# Patient Record
Sex: MALE | Race: WHITE | NOT HISPANIC OR LATINO | Employment: OTHER | URBAN - METROPOLITAN AREA
[De-identification: names, ages, dates, MRNs, and addresses within clinical notes are randomized per-mention and may not be internally consistent; named-entity substitution may affect disease eponyms.]

---

## 2023-07-11 ENCOUNTER — OFFICE VISIT (OUTPATIENT)
Dept: PRIMARY CARE | Facility: CLINIC | Age: 73
End: 2023-07-11
Payer: COMMERCIAL

## 2023-07-11 VITALS
DIASTOLIC BLOOD PRESSURE: 75 MMHG | BODY MASS INDEX: 23.59 KG/M2 | WEIGHT: 164.8 LBS | OXYGEN SATURATION: 98 % | HEART RATE: 70 BPM | SYSTOLIC BLOOD PRESSURE: 135 MMHG | HEIGHT: 70 IN

## 2023-07-11 DIAGNOSIS — D72.829 LEUKOCYTOSIS, UNSPECIFIED TYPE: ICD-10-CM

## 2023-07-11 DIAGNOSIS — E03.9 HYPOTHYROIDISM, UNSPECIFIED TYPE: ICD-10-CM

## 2023-07-11 DIAGNOSIS — E53.8 VITAMIN B12 DEFICIENCY: ICD-10-CM

## 2023-07-11 DIAGNOSIS — I95.1 ORTHOSTATIC HYPOTENSION: ICD-10-CM

## 2023-07-11 DIAGNOSIS — M06.9 RHEUMATOID ARTHRITIS INVOLVING MULTIPLE SITES, UNSPECIFIED WHETHER RHEUMATOID FACTOR PRESENT (MULTI): ICD-10-CM

## 2023-07-11 DIAGNOSIS — R73.9 ELEVATED BLOOD SUGAR: ICD-10-CM

## 2023-07-11 DIAGNOSIS — K21.9 GASTROESOPHAGEAL REFLUX DISEASE WITHOUT ESOPHAGITIS: ICD-10-CM

## 2023-07-11 DIAGNOSIS — E55.9 VITAMIN D DEFICIENCY: ICD-10-CM

## 2023-07-11 DIAGNOSIS — G40.909 SEIZURE DISORDER (MULTI): ICD-10-CM

## 2023-07-11 DIAGNOSIS — M81.0 OSTEOPOROSIS, SENILE: ICD-10-CM

## 2023-07-11 DIAGNOSIS — J42 CHRONIC BRONCHITIS, UNSPECIFIED CHRONIC BRONCHITIS TYPE (MULTI): ICD-10-CM

## 2023-07-11 DIAGNOSIS — N18.30 STAGE 3 CHRONIC KIDNEY DISEASE, UNSPECIFIED WHETHER STAGE 3A OR 3B CKD (MULTI): ICD-10-CM

## 2023-07-11 DIAGNOSIS — Z79.899 MEDICATION MANAGEMENT: ICD-10-CM

## 2023-07-11 DIAGNOSIS — R45.86 MOOD SWINGS: ICD-10-CM

## 2023-07-11 DIAGNOSIS — I73.9 PERIPHERAL VASCULAR DISEASE (CMS-HCC): Primary | ICD-10-CM

## 2023-07-11 DIAGNOSIS — D61.818 PANCYTOPENIA (MULTI): ICD-10-CM

## 2023-07-11 DIAGNOSIS — E78.5 HYPERLIPIDEMIA, UNSPECIFIED HYPERLIPIDEMIA TYPE: ICD-10-CM

## 2023-07-11 DIAGNOSIS — E11.8 TYPE 2 DIABETES MELLITUS WITH UNSPECIFIED COMPLICATIONS (MULTI): ICD-10-CM

## 2023-07-11 DIAGNOSIS — C34.90 MALIGNANT NEOPLASM OF LUNG, UNSPECIFIED LATERALITY, UNSPECIFIED PART OF LUNG (MULTI): ICD-10-CM

## 2023-07-11 PROBLEM — M25.519 SHOULDER PAIN: Status: ACTIVE | Noted: 2023-07-11

## 2023-07-11 PROBLEM — G62.9 PERIPHERAL NEUROPATHY: Status: ACTIVE | Noted: 2023-07-11

## 2023-07-11 PROBLEM — R74.8 ELEVATED ALKALINE PHOSPHATASE LEVEL: Status: ACTIVE | Noted: 2023-07-11

## 2023-07-11 PROBLEM — M15.9 OSTEOARTHRITIS OF MULTIPLE JOINTS: Status: ACTIVE | Noted: 2023-07-11

## 2023-07-11 PROBLEM — H53.40 VISUAL FIELD LOSS: Status: ACTIVE | Noted: 2023-07-11

## 2023-07-11 PROBLEM — M70.20 OLECRANON BURSITIS: Status: ACTIVE | Noted: 2023-07-11

## 2023-07-11 PROBLEM — M70.21 OLECRANON BURSITIS OF RIGHT ELBOW: Status: ACTIVE | Noted: 2023-07-11

## 2023-07-11 PROBLEM — S93.401A RIGHT ANKLE SPRAIN: Status: ACTIVE | Noted: 2023-07-11

## 2023-07-11 PROBLEM — K40.20 INGUINAL HERNIA, BILATERAL: Status: ACTIVE | Noted: 2023-07-11

## 2023-07-11 PROBLEM — R53.83 FATIGUE: Status: ACTIVE | Noted: 2023-07-11

## 2023-07-11 PROBLEM — R80.9 MICROALBUMINURIA: Status: ACTIVE | Noted: 2023-07-11

## 2023-07-11 PROBLEM — M10.9 ACUTE GOUT OF LEFT KNEE: Status: ACTIVE | Noted: 2023-07-11

## 2023-07-11 PROBLEM — M25.50 ARTHRALGIA OF MULTIPLE JOINTS: Status: ACTIVE | Noted: 2023-07-11

## 2023-07-11 PROBLEM — M75.00 FROZEN SHOULDER SYNDROME: Status: ACTIVE | Noted: 2023-07-11

## 2023-07-11 PROBLEM — R93.5 ABNORMAL CT OF THE ABDOMEN: Status: ACTIVE | Noted: 2023-07-11

## 2023-07-11 PROBLEM — R51.9 HEADACHE: Status: ACTIVE | Noted: 2023-07-11

## 2023-07-11 PROBLEM — M54.2 CERVICAL PAIN (NECK): Status: ACTIVE | Noted: 2023-07-11

## 2023-07-11 PROBLEM — E87.5 HYPERKALEMIA: Status: ACTIVE | Noted: 2023-07-11

## 2023-07-11 PROBLEM — M79.676 TOE PAIN: Status: ACTIVE | Noted: 2023-07-11

## 2023-07-11 PROBLEM — M10.9 POLYARTICULAR GOUT: Status: ACTIVE | Noted: 2023-07-11

## 2023-07-11 PROBLEM — R42 DIZZINESS: Status: ACTIVE | Noted: 2023-07-11

## 2023-07-11 PROBLEM — M25.579 ANKLE JOINT PAIN: Status: ACTIVE | Noted: 2023-07-11

## 2023-07-11 PROBLEM — M79.645 PAIN OF FINGER OF LEFT HAND: Status: ACTIVE | Noted: 2023-07-11

## 2023-07-11 PROBLEM — M79.609 LIMB PAIN: Status: ACTIVE | Noted: 2023-07-11

## 2023-07-11 PROBLEM — R55 VASOVAGAL SYNCOPE: Status: ACTIVE | Noted: 2023-07-11

## 2023-07-11 PROBLEM — M54.6 LEFT-SIDED THORACIC BACK PAIN: Status: ACTIVE | Noted: 2023-07-11

## 2023-07-11 PROBLEM — S50.319A ABRASION, ELBOW W/O INFECTION: Status: ACTIVE | Noted: 2023-07-11

## 2023-07-11 PROBLEM — M62.838 CERVICAL PARASPINAL MUSCLE SPASM: Status: ACTIVE | Noted: 2023-07-11

## 2023-07-11 PROBLEM — M25.531 RIGHT WRIST PAIN: Status: ACTIVE | Noted: 2023-07-11

## 2023-07-11 PROBLEM — R20.0 NUMBNESS: Status: ACTIVE | Noted: 2023-07-11

## 2023-07-11 PROBLEM — M79.673 FOOT PAIN: Status: ACTIVE | Noted: 2023-07-11

## 2023-07-11 PROCEDURE — 3078F DIAST BP <80 MM HG: CPT | Performed by: INTERNAL MEDICINE

## 2023-07-11 PROCEDURE — 3075F SYST BP GE 130 - 139MM HG: CPT | Performed by: INTERNAL MEDICINE

## 2023-07-11 PROCEDURE — 1160F RVW MEDS BY RX/DR IN RCRD: CPT | Performed by: INTERNAL MEDICINE

## 2023-07-11 PROCEDURE — 99214 OFFICE O/P EST MOD 30 MIN: CPT | Performed by: INTERNAL MEDICINE

## 2023-07-11 PROCEDURE — 1159F MED LIST DOCD IN RCRD: CPT | Performed by: INTERNAL MEDICINE

## 2023-07-11 RX ORDER — DORZOLAMIDE HCL 20 MG/ML
1 SOLUTION/ DROPS OPHTHALMIC 2 TIMES DAILY
COMMUNITY

## 2023-07-11 RX ORDER — BRIMONIDINE TARTRATE 2 MG/ML
1 SOLUTION/ DROPS OPHTHALMIC 2 TIMES DAILY
COMMUNITY

## 2023-07-11 RX ORDER — LATANOPROST 50 UG/ML
1 SOLUTION/ DROPS OPHTHALMIC NIGHTLY
COMMUNITY

## 2023-07-11 RX ORDER — AMLODIPINE BESYLATE 5 MG/1
TABLET ORAL DAILY
COMMUNITY
End: 2023-09-28 | Stop reason: SDUPTHER

## 2023-07-11 RX ORDER — ASPIRIN 81 MG/1
81 TABLET ORAL DAILY
COMMUNITY

## 2023-07-11 RX ORDER — CLOPIDOGREL BISULFATE 75 MG/1
TABLET ORAL DAILY
COMMUNITY
End: 2023-09-27 | Stop reason: SDUPTHER

## 2023-07-11 RX ORDER — GABAPENTIN 600 MG/1
TABLET ORAL
COMMUNITY

## 2023-07-11 RX ORDER — ERGOCALCIFEROL 1.25 MG/1
1.25 CAPSULE ORAL
COMMUNITY

## 2023-07-11 RX ORDER — LEVETIRACETAM 500 MG/1
TABLET ORAL 2 TIMES DAILY
COMMUNITY
End: 2024-03-08 | Stop reason: WASHOUT

## 2023-07-11 RX ORDER — PREDNISONE 5 MG/1
5 TABLET ORAL DAILY
COMMUNITY
End: 2024-01-09

## 2023-07-11 RX ORDER — FAMOTIDINE 40 MG/1
TABLET, FILM COATED ORAL
COMMUNITY

## 2023-07-11 RX ORDER — FERROUS SULFATE 325(65) MG
65 TABLET ORAL
COMMUNITY
End: 2024-04-11 | Stop reason: WASHOUT

## 2023-07-11 RX ORDER — SODIUM BICARBONATE 650 MG/1
650 TABLET ORAL 2 TIMES DAILY
COMMUNITY

## 2023-07-11 RX ORDER — LOVASTATIN 40 MG/1
40 TABLET ORAL DAILY
COMMUNITY

## 2023-07-11 RX ORDER — HYDROXYCHLOROQUINE SULFATE 200 MG/1
TABLET, FILM COATED ORAL 2 TIMES DAILY
COMMUNITY
End: 2023-07-11 | Stop reason: ALTCHOICE

## 2023-07-11 RX ORDER — LEVOTHYROXINE SODIUM 125 UG/1
125 TABLET ORAL
COMMUNITY
End: 2024-01-08 | Stop reason: SDUPTHER

## 2023-07-11 ASSESSMENT — LIFESTYLE VARIABLES
HOW MANY STANDARD DRINKS CONTAINING ALCOHOL DO YOU HAVE ON A TYPICAL DAY: PATIENT DOES NOT DRINK
HOW OFTEN DO YOU HAVE SIX OR MORE DRINKS ON ONE OCCASION: NEVER
HOW OFTEN DO YOU HAVE A DRINK CONTAINING ALCOHOL: NEVER
SKIP TO QUESTIONS 9-10: 1
AUDIT-C TOTAL SCORE: 0

## 2023-07-11 ASSESSMENT — ENCOUNTER SYMPTOMS
RESPIRATORY NEGATIVE: 1
ALLERGIC/IMMUNOLOGIC NEGATIVE: 1
EYES NEGATIVE: 1
CARDIOVASCULAR NEGATIVE: 1

## 2023-07-11 ASSESSMENT — PATIENT HEALTH QUESTIONNAIRE - PHQ9
1. LITTLE INTEREST OR PLEASURE IN DOING THINGS: NOT AT ALL
SUM OF ALL RESPONSES TO PHQ9 QUESTIONS 1 AND 2: 0
2. FEELING DOWN, DEPRESSED OR HOPELESS: NOT AT ALL

## 2023-07-11 ASSESSMENT — COLUMBIA-SUICIDE SEVERITY RATING SCALE - C-SSRS: 1. IN THE PAST MONTH, HAVE YOU WISHED YOU WERE DEAD OR WISHED YOU COULD GO TO SLEEP AND NOT WAKE UP?: NO

## 2023-07-11 NOTE — PROGRESS NOTES
Subjective   Patient ID: Jose Manuel Andujar is a 73 y.o. male who presents for New Patient Visit.  HPI      Npv  mutiple prrblems.   Copd  sp lung  cancer.  Xrt and lobectomy.   Last  A1c was good  April. .  He is watching his diet now.     Seizures controlled   Gabapentin for  neuropathy.      Basically feels ok.   No new complaints.     Review of Systems   HENT: Negative.     Eyes: Negative.    Respiratory: Negative.     Cardiovascular: Negative.    Genitourinary: Negative.    Skin: Negative.    Allergic/Immunologic: Negative.    All other systems reviewed and are negative.      Objective   Physical Exam  Vitals and nursing note reviewed.   Constitutional:       Appearance: Normal appearance.   HENT:      Head: Normocephalic and atraumatic.      Left Ear: External ear normal.      Nose: Nose normal.      Mouth/Throat:      Pharynx: Oropharynx is clear.   Eyes:      Conjunctiva/sclera: Conjunctivae normal.   Cardiovascular:      Rate and Rhythm: Normal rate and regular rhythm.      Pulses: Normal pulses.      Heart sounds: Normal heart sounds.   Pulmonary:      Effort: Pulmonary effort is normal.   Skin:     General: Skin is dry.   Neurological:      General: No focal deficit present.      Mental Status: He is alert and oriented to person, place, and time. Mental status is at baseline.   Psychiatric:         Mood and Affect: Mood normal.         Behavior: Behavior normal.         Assessment/Plan   Problem List Items Addressed This Visit          Medium    Pancytopenia (CMS/HCC)    Relevant Medications    clopidogrel (Plavix) 75 mg tablet    aspirin 81 mg EC tablet    Vitamin D deficiency    Vitamin B12 deficiency    Seizure disorder (CMS/HCC)    Peripheral vascular disease (CMS/HCC) - Primary    Osteoporosis, senile    Orthostatic hypotension    Mood swings    Lung cancer (CMS/HCC)    Leukocytosis    Hypothyroidism    Hyperlipidemia    Gastroesophageal reflux disease    Elevated blood sugar    Rheumatoid arthritis  involving multiple sites, unspecified whether rheumatoid factor present (CMS/HCC)    Chronic bronchitis, unspecified chronic bronchitis type (CMS/HCC)    Type 2 diabetes mellitus with unspecified complications (CMS/[facility])    Stage 3 chronic kidney disease, unspecified whether stage 3a or 3b CKD (CMS/[facility])     Other Visit Diagnoses       Medication management              Chronic problems controlled  on current treatment  unless otherwise noted.     CHART  REVIEWED AND  RECONCILED WITH OLD DATA / UPDATED IN NEW SYSTEM:  MEDS,  PROBLEMS,  ALLERGIES, SOC HISTORY.     Medication management .

## 2023-09-11 DIAGNOSIS — I73.9 PERIPHERAL VASCULAR DISEASE, UNSPECIFIED (CMS-HCC): ICD-10-CM

## 2023-09-11 RX ORDER — ATORVASTATIN CALCIUM 40 MG/1
40 TABLET, FILM COATED ORAL DAILY
Qty: 90 TABLET | Refills: 1 | Status: SHIPPED | OUTPATIENT
Start: 2023-09-11 | End: 2024-02-26

## 2023-09-27 DIAGNOSIS — I73.9 PERIPHERAL VASCULAR DISEASE (CMS-HCC): Primary | ICD-10-CM

## 2023-09-27 DIAGNOSIS — I65.29 CAROTID ARTERY CALCIFICATION, UNSPECIFIED LATERALITY: ICD-10-CM

## 2023-09-27 RX ORDER — CLOPIDOGREL BISULFATE 75 MG/1
75 TABLET ORAL DAILY
Qty: 90 TABLET | Refills: 1 | Status: SHIPPED | OUTPATIENT
Start: 2023-09-27 | End: 2024-03-25

## 2023-09-28 DIAGNOSIS — I73.9 PERIPHERAL VASCULAR DISEASE (CMS-HCC): Primary | ICD-10-CM

## 2023-09-28 DIAGNOSIS — I10 HYPERTENSION, UNSPECIFIED TYPE: ICD-10-CM

## 2023-09-28 RX ORDER — AMLODIPINE BESYLATE 5 MG/1
5 TABLET ORAL DAILY
Qty: 90 TABLET | Refills: 1 | Status: SHIPPED | OUTPATIENT
Start: 2023-09-28 | End: 2024-03-25

## 2023-12-29 ENCOUNTER — APPOINTMENT (OUTPATIENT)
Dept: RHEUMATOLOGY | Facility: CLINIC | Age: 73
End: 2023-12-29
Payer: COMMERCIAL

## 2024-01-03 ENCOUNTER — OFFICE VISIT (OUTPATIENT)
Dept: RHEUMATOLOGY | Facility: CLINIC | Age: 74
End: 2024-01-03
Payer: COMMERCIAL

## 2024-01-03 VITALS — WEIGHT: 163 LBS | DIASTOLIC BLOOD PRESSURE: 66 MMHG | BODY MASS INDEX: 23.39 KG/M2 | SYSTOLIC BLOOD PRESSURE: 138 MMHG

## 2024-01-03 DIAGNOSIS — M06.9 RHEUMATOID ARTHRITIS INVOLVING MULTIPLE SITES, UNSPECIFIED WHETHER RHEUMATOID FACTOR PRESENT (MULTI): Primary | ICD-10-CM

## 2024-01-03 DIAGNOSIS — M81.0 OSTEOPOROSIS, SENILE: ICD-10-CM

## 2024-01-03 DIAGNOSIS — M15.9 OSTEOARTHRITIS OF MULTIPLE JOINTS, UNSPECIFIED OSTEOARTHRITIS TYPE: ICD-10-CM

## 2024-01-03 PROCEDURE — 99214 OFFICE O/P EST MOD 30 MIN: CPT | Performed by: INTERNAL MEDICINE

## 2024-01-03 PROCEDURE — 1159F MED LIST DOCD IN RCRD: CPT | Performed by: INTERNAL MEDICINE

## 2024-01-03 PROCEDURE — 3075F SYST BP GE 130 - 139MM HG: CPT | Performed by: INTERNAL MEDICINE

## 2024-01-03 PROCEDURE — 3078F DIAST BP <80 MM HG: CPT | Performed by: INTERNAL MEDICINE

## 2024-01-03 NOTE — PROGRESS NOTES
"Recheck  OA  /  RA  /  OP      Doing well.     Bc    HPI - he is doing well.  \"I feel great!\"  No pain/swelling.  No AM stiffness.  Usual L pinky contracture.  No CP, SOB, or GI.      PE  NAD  RRR no r/m/g  CTA  No edema  Min NT synovial thickening R 3rd MCP    A/P - OA and RA - on low-dose pred but no DMARDs per pt preference - stable.  Pt to call if sx incr  OP - had reclast after prolia, on drug holiday - due for DEXA 7/24  Reeval 6 mo or sooner PRN    "

## 2024-01-06 DIAGNOSIS — E03.9 HYPOTHYROIDISM, UNSPECIFIED TYPE: Primary | ICD-10-CM

## 2024-01-08 DIAGNOSIS — M06.9 RHEUMATOID ARTHRITIS INVOLVING MULTIPLE SITES, UNSPECIFIED WHETHER RHEUMATOID FACTOR PRESENT (MULTI): Primary | ICD-10-CM

## 2024-01-08 RX ORDER — LEVOTHYROXINE SODIUM 125 UG/1
125 TABLET ORAL
Qty: 90 TABLET | Refills: 1 | Status: SHIPPED | OUTPATIENT
Start: 2024-01-08 | End: 2025-01-07

## 2024-01-09 RX ORDER — PREDNISONE 5 MG/1
5 TABLET ORAL DAILY
Qty: 90 TABLET | Refills: 1 | Status: SHIPPED | OUTPATIENT
Start: 2024-01-09

## 2024-01-11 ENCOUNTER — APPOINTMENT (OUTPATIENT)
Dept: PRIMARY CARE | Facility: CLINIC | Age: 74
End: 2024-01-11
Payer: COMMERCIAL

## 2024-01-15 ENCOUNTER — APPOINTMENT (OUTPATIENT)
Dept: PRIMARY CARE | Facility: CLINIC | Age: 74
End: 2024-01-15
Payer: COMMERCIAL

## 2024-02-12 ENCOUNTER — APPOINTMENT (OUTPATIENT)
Dept: PRIMARY CARE | Facility: CLINIC | Age: 74
End: 2024-02-12
Payer: COMMERCIAL

## 2024-02-24 DIAGNOSIS — I73.9 PERIPHERAL VASCULAR DISEASE, UNSPECIFIED (CMS-HCC): ICD-10-CM

## 2024-02-26 RX ORDER — ATORVASTATIN CALCIUM 40 MG/1
40 TABLET, FILM COATED ORAL DAILY
Qty: 90 TABLET | Refills: 1 | Status: SHIPPED | OUTPATIENT
Start: 2024-02-26

## 2024-03-05 DIAGNOSIS — G40.909 EPILEPSY, UNSPECIFIED, NOT INTRACTABLE, WITHOUT STATUS EPILEPTICUS (MULTI): ICD-10-CM

## 2024-03-05 RX ORDER — LEVETIRACETAM 250 MG/1
250 TABLET ORAL 2 TIMES DAILY
Qty: 180 TABLET | Refills: 3 | Status: SHIPPED | OUTPATIENT
Start: 2024-03-05

## 2024-03-08 ENCOUNTER — OFFICE VISIT (OUTPATIENT)
Dept: NEUROLOGY | Facility: CLINIC | Age: 74
End: 2024-03-08
Payer: COMMERCIAL

## 2024-03-08 DIAGNOSIS — G62.89 OTHER POLYNEUROPATHY: ICD-10-CM

## 2024-03-08 DIAGNOSIS — G40.909 SEIZURE DISORDER (MULTI): Primary | ICD-10-CM

## 2024-03-08 PROCEDURE — 99214 OFFICE O/P EST MOD 30 MIN: CPT | Performed by: PSYCHIATRY & NEUROLOGY

## 2024-03-08 PROCEDURE — 1160F RVW MEDS BY RX/DR IN RCRD: CPT | Performed by: PSYCHIATRY & NEUROLOGY

## 2024-03-08 PROCEDURE — 1159F MED LIST DOCD IN RCRD: CPT | Performed by: PSYCHIATRY & NEUROLOGY

## 2024-03-08 NOTE — PATIENT INSTRUCTIONS
Jose Manuel I am retiring in September.  Please establish care with another  neurologist (call [phone] to schedule an appointment).  Local neurologists I recommend are Dr. Kyle Elmore, Dr. Sheng Schuster, and Dr. Castro Clement.  Their offices are in [place] or [place] and you can schedule an appointment with them right now at your convenience.  Also, I can recommend Dr. Rudolph Coyle and Dr. Codey Guerra, who will be opening an office in [place] in the coming months - scheduling for them will be available soon by calling the same  scheduling number [phone].   And there are  neurologists in other locations too.  All of your medical records will be in the computer and available to any  physician you choose.  Thanks and best wishes to you  --Dr. Bravo

## 2024-03-08 NOTE — PROGRESS NOTES
Chief complaint:    74 year old man with seizure disorder, PN (DM, RA, CKD).  PCP Dr. Schultz.    HPI:    No seizures.  No side effects.  Takes meds regularly.  No new neurological symptoms.   PN doing about the same.  Uncomfortable sensation like he is walking on gravel from time to time, can live with it.  Been more than five years since lung cancer diagnosis, no signs of trouble there.  Feels good.    Current medications include:  Levetiracetam 250 mg takes 1-1  Gabapentin 600 mg takes 1 at HS    I reviewed the relevant portions of the patient's chart since the last visit with me on  3/10/23.    Neurologic Exam     Mental Status   Oriented to person, place, and time.   Level of consciousness: alert    Cranial Nerves   Cranial nerves II through XII intact.     Motor Exam   Muscle bulk: normal  Overall muscle tone: normal    Strength   Strength 5/5 except as noted.     Sensory Exam   Right leg vibration: decreased from toes  Left leg vibration: decreased from toes  Right leg proprioception: normal  Left leg proprioception: normal  Right leg pinprick: decreased from toes  Left leg pinprick: decreased from toes    Gait, Coordination, and Reflexes     Gait  Gait: normal    Coordination   Romberg: negative    Reflexes   Reflexes 2+ except as noted.   Right plantar: normal  Left plantar: normal     I reviewed the following data on the patient:  Lab work reviewed.    Assessment and Plan:  Seizure-free.  Doing well.  PN also stable.  Discussed.  Continue current treatment.   The patient was advised I will be retiring in September of 2024.   Suggested patient establish care with another  neurologist.  There are providers that see patients in [place], [place], and other area locations.  Contact information to schedule was provided to the patient.   Call me if needed.   [id number]>50%    Sarwat Bravo MD

## 2024-03-20 ENCOUNTER — APPOINTMENT (OUTPATIENT)
Dept: PRIMARY CARE | Facility: CLINIC | Age: 74
End: 2024-03-20
Payer: COMMERCIAL

## 2024-03-23 DIAGNOSIS — I65.29 CAROTID ARTERY CALCIFICATION, UNSPECIFIED LATERALITY: ICD-10-CM

## 2024-03-23 DIAGNOSIS — I73.9 PERIPHERAL VASCULAR DISEASE (CMS-HCC): ICD-10-CM

## 2024-03-23 DIAGNOSIS — I10 HYPERTENSION, UNSPECIFIED TYPE: ICD-10-CM

## 2024-03-25 RX ORDER — CLOPIDOGREL BISULFATE 75 MG/1
75 TABLET ORAL DAILY
Qty: 90 TABLET | Refills: 1 | Status: SHIPPED | OUTPATIENT
Start: 2024-03-25

## 2024-03-25 RX ORDER — AMLODIPINE BESYLATE 5 MG/1
5 TABLET ORAL DAILY
Qty: 90 TABLET | Refills: 1 | Status: SHIPPED | OUTPATIENT
Start: 2024-03-25

## 2024-04-04 ENCOUNTER — APPOINTMENT (OUTPATIENT)
Dept: LAB | Facility: CLINIC | Age: 74
End: 2024-04-04
Payer: COMMERCIAL

## 2024-04-04 DIAGNOSIS — C34.90 MALIGNANT NEOPLASM OF LUNG, UNSPECIFIED LATERALITY, UNSPECIFIED PART OF LUNG (MULTI): ICD-10-CM

## 2024-04-04 LAB
ALBUMIN SERPL BCP-MCNC: 3.9 G/DL (ref 3.4–5)
ALP SERPL-CCNC: 103 U/L (ref 33–136)
ALT SERPL W P-5'-P-CCNC: 20 U/L (ref 10–52)
ANION GAP SERPL CALC-SCNC: 12 MMOL/L (ref 10–20)
AST SERPL W P-5'-P-CCNC: 14 U/L (ref 9–39)
BASOPHILS # BLD AUTO: 0.04 X10*3/UL (ref 0–0.1)
BASOPHILS NFR BLD AUTO: 0.4 %
BILIRUB SERPL-MCNC: 0.4 MG/DL (ref 0–1.2)
BUN SERPL-MCNC: 25 MG/DL (ref 6–23)
CALCIUM SERPL-MCNC: 8.9 MG/DL (ref 8.6–10.3)
CHLORIDE SERPL-SCNC: 106 MMOL/L (ref 98–107)
CO2 SERPL-SCNC: 23 MMOL/L (ref 21–32)
CREAT SERPL-MCNC: 1.8 MG/DL (ref 0.5–1.3)
EGFRCR SERPLBLD CKD-EPI 2021: 39 ML/MIN/1.73M*2
EOSINOPHIL # BLD AUTO: 0.05 X10*3/UL (ref 0–0.4)
EOSINOPHIL NFR BLD AUTO: 0.6 %
ERYTHROCYTE [DISTWIDTH] IN BLOOD BY AUTOMATED COUNT: 12.6 % (ref 11.5–14.5)
GLUCOSE SERPL-MCNC: 290 MG/DL (ref 74–99)
HCT VFR BLD AUTO: 38.9 % (ref 41–52)
HGB BLD-MCNC: 13.3 G/DL (ref 13.5–17.5)
IMM GRANULOCYTES # BLD AUTO: 0.04 X10*3/UL (ref 0–0.5)
IMM GRANULOCYTES NFR BLD AUTO: 0.4 % (ref 0–0.9)
LYMPHOCYTES # BLD AUTO: 0.66 X10*3/UL (ref 0.8–3)
LYMPHOCYTES NFR BLD AUTO: 7.3 %
MCH RBC QN AUTO: 32 PG (ref 26–34)
MCHC RBC AUTO-ENTMCNC: 34.2 G/DL (ref 32–36)
MCV RBC AUTO: 94 FL (ref 80–100)
MONOCYTES # BLD AUTO: 0.49 X10*3/UL (ref 0.05–0.8)
MONOCYTES NFR BLD AUTO: 5.4 %
NEUTROPHILS # BLD AUTO: 7.78 X10*3/UL (ref 1.6–5.5)
NEUTROPHILS NFR BLD AUTO: 85.9 %
NRBC BLD-RTO: 0 /100 WBCS (ref 0–0)
PLATELET # BLD AUTO: 247 X10*3/UL (ref 150–450)
POTASSIUM SERPL-SCNC: 4.4 MMOL/L (ref 3.5–5.3)
PROT SERPL-MCNC: 6.2 G/DL (ref 6.4–8.2)
RBC # BLD AUTO: 4.15 X10*6/UL (ref 4.5–5.9)
SODIUM SERPL-SCNC: 137 MMOL/L (ref 136–145)
WBC # BLD AUTO: 9.1 X10*3/UL (ref 4.4–11.3)

## 2024-04-04 PROCEDURE — 36415 COLL VENOUS BLD VENIPUNCTURE: CPT | Performed by: STUDENT IN AN ORGANIZED HEALTH CARE EDUCATION/TRAINING PROGRAM

## 2024-04-04 PROCEDURE — 85025 COMPLETE CBC W/AUTO DIFF WBC: CPT | Performed by: STUDENT IN AN ORGANIZED HEALTH CARE EDUCATION/TRAINING PROGRAM

## 2024-04-04 PROCEDURE — 80053 COMPREHEN METABOLIC PANEL: CPT | Performed by: STUDENT IN AN ORGANIZED HEALTH CARE EDUCATION/TRAINING PROGRAM

## 2024-04-05 ENCOUNTER — HOSPITAL ENCOUNTER (OUTPATIENT)
Dept: RADIOLOGY | Facility: CLINIC | Age: 74
Discharge: HOME | End: 2024-04-05
Payer: COMMERCIAL

## 2024-04-05 DIAGNOSIS — C34.11 MALIGNANT NEOPLASM OF UPPER LOBE, RIGHT BRONCHUS OR LUNG (MULTI): ICD-10-CM

## 2024-04-05 DIAGNOSIS — C34.90 MALIGNANT NEOPLASM OF LUNG, UNSPECIFIED LATERALITY, UNSPECIFIED PART OF LUNG (MULTI): ICD-10-CM

## 2024-04-05 PROCEDURE — 71250 CT THORAX DX C-: CPT

## 2024-04-05 PROCEDURE — 71250 CT THORAX DX C-: CPT | Performed by: RADIOLOGY

## 2024-04-09 ENCOUNTER — APPOINTMENT (OUTPATIENT)
Dept: HEMATOLOGY/ONCOLOGY | Facility: CLINIC | Age: 74
End: 2024-04-09
Payer: COMMERCIAL

## 2024-04-11 ENCOUNTER — APPOINTMENT (OUTPATIENT)
Dept: PRIMARY CARE | Facility: CLINIC | Age: 74
End: 2024-04-11
Payer: COMMERCIAL

## 2024-04-11 ENCOUNTER — OFFICE VISIT (OUTPATIENT)
Dept: PRIMARY CARE | Facility: CLINIC | Age: 74
End: 2024-04-11
Payer: COMMERCIAL

## 2024-04-11 ENCOUNTER — HOSPITAL ENCOUNTER (OUTPATIENT)
Dept: RADIOLOGY | Facility: HOSPITAL | Age: 74
Discharge: HOME | End: 2024-04-11
Payer: COMMERCIAL

## 2024-04-11 VITALS
WEIGHT: 160 LBS | HEART RATE: 66 BPM | OXYGEN SATURATION: 95 % | HEIGHT: 70 IN | DIASTOLIC BLOOD PRESSURE: 62 MMHG | BODY MASS INDEX: 22.9 KG/M2 | SYSTOLIC BLOOD PRESSURE: 136 MMHG

## 2024-04-11 DIAGNOSIS — E11.22 TYPE 2 DM WITH CKD STAGE 3 AND HYPERTENSION (MULTI): ICD-10-CM

## 2024-04-11 DIAGNOSIS — M06.9 RHEUMATOID ARTHRITIS INVOLVING MULTIPLE SITES, UNSPECIFIED WHETHER RHEUMATOID FACTOR PRESENT (MULTI): ICD-10-CM

## 2024-04-11 DIAGNOSIS — Z00.00 ROUTINE GENERAL MEDICAL EXAMINATION AT HEALTH CARE FACILITY: Primary | ICD-10-CM

## 2024-04-11 DIAGNOSIS — Z13.1 DIABETES MELLITUS SCREENING: ICD-10-CM

## 2024-04-11 DIAGNOSIS — N28.1 CYST OF KIDNEY, ACQUIRED: ICD-10-CM

## 2024-04-11 DIAGNOSIS — Z12.5 SCREENING FOR PROSTATE CANCER: ICD-10-CM

## 2024-04-11 DIAGNOSIS — E03.9 HYPOTHYROIDISM, UNSPECIFIED TYPE: ICD-10-CM

## 2024-04-11 DIAGNOSIS — K21.9 GASTROESOPHAGEAL REFLUX DISEASE WITHOUT ESOPHAGITIS: ICD-10-CM

## 2024-04-11 DIAGNOSIS — Z13.6 SCREENING FOR AAA (ABDOMINAL AORTIC ANEURYSM): ICD-10-CM

## 2024-04-11 DIAGNOSIS — E11.8 TYPE 2 DIABETES MELLITUS WITH UNSPECIFIED COMPLICATIONS (MULTI): ICD-10-CM

## 2024-04-11 DIAGNOSIS — E78.5 DYSLIPIDEMIA: ICD-10-CM

## 2024-04-11 DIAGNOSIS — I73.9 PERIPHERAL VASCULAR DISEASE (CMS-HCC): ICD-10-CM

## 2024-04-11 DIAGNOSIS — Z13.6 SCREENING FOR CARDIOVASCULAR CONDITION: ICD-10-CM

## 2024-04-11 DIAGNOSIS — N18.30 TYPE 2 DM WITH CKD STAGE 3 AND HYPERTENSION (MULTI): ICD-10-CM

## 2024-04-11 DIAGNOSIS — I12.9 TYPE 2 DM WITH CKD STAGE 3 AND HYPERTENSION (MULTI): ICD-10-CM

## 2024-04-11 DIAGNOSIS — G40.909 SEIZURE DISORDER (MULTI): ICD-10-CM

## 2024-04-11 PROCEDURE — 76770 US EXAM ABDO BACK WALL COMP: CPT

## 2024-04-11 PROCEDURE — 3075F SYST BP GE 130 - 139MM HG: CPT | Performed by: INTERNAL MEDICINE

## 2024-04-11 PROCEDURE — 1160F RVW MEDS BY RX/DR IN RCRD: CPT | Performed by: INTERNAL MEDICINE

## 2024-04-11 PROCEDURE — 76770 US EXAM ABDO BACK WALL COMP: CPT | Performed by: RADIOLOGY

## 2024-04-11 PROCEDURE — 1170F FXNL STATUS ASSESSED: CPT | Performed by: INTERNAL MEDICINE

## 2024-04-11 PROCEDURE — 93000 ELECTROCARDIOGRAM COMPLETE: CPT | Performed by: INTERNAL MEDICINE

## 2024-04-11 PROCEDURE — 3078F DIAST BP <80 MM HG: CPT | Performed by: INTERNAL MEDICINE

## 2024-04-11 PROCEDURE — 99214 OFFICE O/P EST MOD 30 MIN: CPT | Performed by: INTERNAL MEDICINE

## 2024-04-11 PROCEDURE — 1126F AMNT PAIN NOTED NONE PRSNT: CPT | Performed by: INTERNAL MEDICINE

## 2024-04-11 PROCEDURE — 1159F MED LIST DOCD IN RCRD: CPT | Performed by: INTERNAL MEDICINE

## 2024-04-11 RX ORDER — GLIMEPIRIDE 4 MG/1
4 TABLET ORAL
Qty: 30 TABLET | Refills: 3 | Status: SHIPPED | OUTPATIENT
Start: 2024-04-11 | End: 2025-04-11

## 2024-04-11 RX ORDER — INSULIN PUMP SYRINGE, 3 ML
EACH MISCELLANEOUS
Qty: 1 EACH | Refills: 0 | Status: SHIPPED | OUTPATIENT
Start: 2024-04-11 | End: 2025-04-11

## 2024-04-11 RX ORDER — BLOOD SUGAR DIAGNOSTIC
1 STRIP MISCELLANEOUS DAILY
Qty: 30 STRIP | Refills: 2 | Status: SHIPPED | OUTPATIENT
Start: 2024-04-11

## 2024-04-11 RX ORDER — LANCETS 33 GAUGE
EACH MISCELLANEOUS
Qty: 30 EACH | Refills: 0 | Status: SHIPPED | OUTPATIENT
Start: 2024-04-11 | End: 2024-05-13

## 2024-04-11 ASSESSMENT — ACTIVITIES OF DAILY LIVING (ADL)
DRESSING: INDEPENDENT
MANAGING_FINANCES: INDEPENDENT
BATHING: INDEPENDENT
DOING_HOUSEWORK: INDEPENDENT
GROCERY_SHOPPING: INDEPENDENT
TAKING_MEDICATION: INDEPENDENT

## 2024-04-11 ASSESSMENT — COLUMBIA-SUICIDE SEVERITY RATING SCALE - C-SSRS: 1. IN THE PAST MONTH, HAVE YOU WISHED YOU WERE DEAD OR WISHED YOU COULD GO TO SLEEP AND NOT WAKE UP?: NO

## 2024-04-11 ASSESSMENT — PATIENT HEALTH QUESTIONNAIRE - PHQ9
SUM OF ALL RESPONSES TO PHQ9 QUESTIONS 1 AND 2: 0
1. LITTLE INTEREST OR PLEASURE IN DOING THINGS: NOT AT ALL
2. FEELING DOWN, DEPRESSED OR HOPELESS: NOT AT ALL

## 2024-04-11 ASSESSMENT — PAIN SCALES - GENERAL: PAINLEVEL: 0-NO PAIN

## 2024-04-11 NOTE — PROGRESS NOTES
"Subjective   Patient ID: Jose Manuel Andujar is a 74 y.o. male who presents for Medicare Annual Wellness Visit Initial.    HPI     Review of Systems    Objective   /62   Pulse 66   Ht 1.778 m (5' 10\")   Wt 72.6 kg (160 lb)   SpO2 95%   BMI 22.96 kg/m²     Physical Exam    Assessment/Plan          "

## 2024-04-11 NOTE — PROGRESS NOTES
Subjective   Reason for Visit: Jose Manuel Andujar is an 74 y.o. male here for a Medicare Wellness visit.     Past Medical, Surgical, and Family History reviewed and updated in chart.    Reviewed all medications by prescribing practitioner or clinical pharmacist (such as prescriptions, OTCs, herbal therapies and supplements) and documented in the medical record.    HPI patient presents to clinic for follow-up visit on history of seizure disorder, hypothyroidism, dyslipidemia, rheumatoid arthritis, nicotine dependence, lung cancer requiring right partial lobectomy, osteoporosis, hypothyroidism, gastroesophageal reflux disease, peripheral vascular disease,s/p stenting of right leg, impaired fasting glucose, stage III chronic kidney disease, peripheral neuropathy, left carotid endarterectomy in 2018, carotid artery disease, and nicotine dependence.  He is doing fairly well and denies any cough, congestion, hemoptysis weight loss and palpitation.  He is also here for Medicare annual wellness exam.  Laboratory studies done last week with oncology clinic revealed serum glucose of 290, creatinine of 1.80 BUN of 25 GFR of 39 mL/min and other studies have been reviewed and discussed with him.  EKG done shows sinus rhythm at 66 bpm with normal axis normal intervals nonspecific T wave changes without any ischemia.    Patient Care Team:  Paulie Benites MD as PCP - General (Internal Medicine)  Rebecca Schultz MD as PCP - [facility] [employer] PCP  Bubba Parsons DO     Review of Systems   Constitutional: Negative.    HENT: Negative.     Eyes: Negative.    Respiratory: Negative.     Cardiovascular: Negative.    Gastrointestinal: Negative.    Endocrine: Negative.    Genitourinary: Negative.    Musculoskeletal: Negative.    Skin: Negative.    Allergic/Immunologic: Negative.    Neurological: Negative.    Hematological: Negative.    Psychiatric/Behavioral: Negative.         Objective   Vitals:  /62   Pulse 66   Ht 1.778  "m (5' 10\")   Wt 72.6 kg (160 lb)   SpO2 95%   BMI 22.96 kg/m²       Physical Exam  Constitutional:       Appearance: Normal appearance. He is normal weight.   HENT:      Right Ear: Tympanic membrane normal.      Left Ear: Tympanic membrane and ear canal normal.      Nose: Nose normal.   Neck:      Vascular: No carotid bruit.   Cardiovascular:      Rate and Rhythm: Normal rate.   Pulmonary:      Effort: No respiratory distress.      Breath sounds: No stridor. No wheezing.   Abdominal:      Palpations: Abdomen is soft.      Tenderness: There is no abdominal tenderness. There is no guarding or rebound.   Skin:     Coloration: Skin is not jaundiced.      Findings: No bruising.   Neurological:      General: No focal deficit present.      Mental Status: He is alert and oriented to person, place, and time.   Psychiatric:         Mood and Affect: Mood normal.         Assessment/Plan   Problem List Items Addressed This Visit    None  Patient will be scheduled for CT cardiac scoring regarding screening for heart disease and abdominal aortic ultrasound regarding screening for abdominal aortic aneurysm.  He is encouraged to cut down on tobacco use.  He will be scheduled for routine blood work.  He is started on glimepiride 4 mg daily regarding new onset of diabetes.  He will continue other medications and will return to clinic in 1 to 2 months for follow-up visit.  He will continue to follow-up with oncology clinic regarding history of lung cancer.       "

## 2024-04-14 ASSESSMENT — ENCOUNTER SYMPTOMS
RESPIRATORY NEGATIVE: 1
CARDIOVASCULAR NEGATIVE: 1
MUSCULOSKELETAL NEGATIVE: 1
GASTROINTESTINAL NEGATIVE: 1
NEUROLOGICAL NEGATIVE: 1
ALLERGIC/IMMUNOLOGIC NEGATIVE: 1
CONSTITUTIONAL NEGATIVE: 1
EYES NEGATIVE: 1
HEMATOLOGIC/LYMPHATIC NEGATIVE: 1
ENDOCRINE NEGATIVE: 1
PSYCHIATRIC NEGATIVE: 1

## 2024-04-16 ENCOUNTER — TELEPHONE (OUTPATIENT)
Dept: PRIMARY CARE | Facility: CLINIC | Age: 74
End: 2024-04-16
Payer: COMMERCIAL

## 2024-04-16 NOTE — TELEPHONE ENCOUNTER
Patients blood glucose monitoring meter kit is out of stock everywhere he is wondering if there is another one that works with his test strips and lancets

## 2024-04-17 ENCOUNTER — TELEPHONE (OUTPATIENT)
Dept: PRIMARY CARE | Facility: CLINIC | Age: 74
End: 2024-04-17
Payer: COMMERCIAL

## 2024-04-17 DIAGNOSIS — E11.8 TYPE 2 DIABETES MELLITUS WITH UNSPECIFIED COMPLICATIONS (MULTI): Primary | ICD-10-CM

## 2024-04-17 RX ORDER — BLOOD-GLUCOSE METER
1 EACH MISCELLANEOUS DAILY
Qty: 50 STRIP | Refills: 2 | Status: SHIPPED | OUTPATIENT
Start: 2024-04-17

## 2024-04-29 ENCOUNTER — TELEPHONE (OUTPATIENT)
Dept: PRIMARY CARE | Facility: CLINIC | Age: 74
End: 2024-04-29
Payer: COMMERCIAL

## 2024-04-29 NOTE — TELEPHONE ENCOUNTER
Pt stopped taking glimepiride due to sugar being between 45-50 and after he stopped taking it would be 90 in the morning and 120 evenings. He said as long as he doesn't eat sweets his levels are good.

## 2024-05-09 ENCOUNTER — OFFICE VISIT (OUTPATIENT)
Dept: HEMATOLOGY/ONCOLOGY | Facility: CLINIC | Age: 74
End: 2024-05-09
Payer: COMMERCIAL

## 2024-05-09 VITALS
OXYGEN SATURATION: 99 % | HEART RATE: 63 BPM | RESPIRATION RATE: 18 BRPM | SYSTOLIC BLOOD PRESSURE: 155 MMHG | WEIGHT: 158.07 LBS | HEIGHT: 69 IN | TEMPERATURE: 97 F | DIASTOLIC BLOOD PRESSURE: 73 MMHG | BODY MASS INDEX: 23.41 KG/M2

## 2024-05-09 DIAGNOSIS — C34.11 PRIMARY SQUAMOUS CELL CARCINOMA OF BRONCHUS IN RIGHT UPPER LOBE (MULTI): Primary | ICD-10-CM

## 2024-05-09 PROBLEM — C34.90 LUNG CANCER (MULTI): Status: RESOLVED | Noted: 2023-07-11 | Resolved: 2024-05-09

## 2024-05-09 PROCEDURE — 99213 OFFICE O/P EST LOW 20 MIN: CPT | Performed by: STUDENT IN AN ORGANIZED HEALTH CARE EDUCATION/TRAINING PROGRAM

## 2024-05-09 PROCEDURE — 1159F MED LIST DOCD IN RCRD: CPT | Performed by: STUDENT IN AN ORGANIZED HEALTH CARE EDUCATION/TRAINING PROGRAM

## 2024-05-09 PROCEDURE — 1126F AMNT PAIN NOTED NONE PRSNT: CPT | Performed by: STUDENT IN AN ORGANIZED HEALTH CARE EDUCATION/TRAINING PROGRAM

## 2024-05-09 PROCEDURE — 1160F RVW MEDS BY RX/DR IN RCRD: CPT | Performed by: STUDENT IN AN ORGANIZED HEALTH CARE EDUCATION/TRAINING PROGRAM

## 2024-05-09 ASSESSMENT — COLUMBIA-SUICIDE SEVERITY RATING SCALE - C-SSRS
2. HAVE YOU ACTUALLY HAD ANY THOUGHTS OF KILLING YOURSELF?: NO
1. IN THE PAST MONTH, HAVE YOU WISHED YOU WERE DEAD OR WISHED YOU COULD GO TO SLEEP AND NOT WAKE UP?: NO
6. HAVE YOU EVER DONE ANYTHING, STARTED TO DO ANYTHING, OR PREPARED TO DO ANYTHING TO END YOUR LIFE?: NO

## 2024-05-09 ASSESSMENT — PAIN SCALES - GENERAL: PAINLEVEL: 0-NO PAIN

## 2024-05-09 ASSESSMENT — PATIENT HEALTH QUESTIONNAIRE - PHQ9
1. LITTLE INTEREST OR PLEASURE IN DOING THINGS: NOT AT ALL
2. FEELING DOWN, DEPRESSED OR HOPELESS: NOT AT ALL
SUM OF ALL RESPONSES TO PHQ9 QUESTIONS 1 AND 2: 0

## 2024-05-09 NOTE — PROGRESS NOTES
Subjective:   Patient Name: Jose Manuel Andujar    : 1950     MRN: [id number]     Age: 74 y.o.     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 completed 2018.     Presenting History (2024): At time of initial presentation a 74 y.o. male, current smoker (started at age 15, 1 pack per day) with a past medical history of RA, BPH, HLD, HTN, DM2, GERD, PAD, hypothyroidism, referred for management of lung cancer    2018: RUL biopsy: fragments of necrotic tissue and partially hyalinized and inflamed fibrous connective tissue.  4Rmetaplastic squamous cells and rare bronchial epithelial cells.     2018: RUL lobectomy, tumor measures 5.5x4.x4cm, squamous cell carcinoma, moderately to poorly differentiated, VPI +, LVI indeterminate. 2 Peribronchial Lns were positive. 3 hilar Lns were negative.   LN positive total. [id number]    No cough or hemoptis. No SOB. No n/v/d/c. No fever or chills. Eating and voiding well. Energy level is fair. He continues to smoke every day. No intention to quit.    PMHx: RA, BPH, HLD, T2DM, GERD, HTN, PAD, hypothyroidism  PSHx: 2018 RUL lobectomy, cholecystectomy, knee replacements (MIKAELA), hammertoe (L foot), stent in left leg, MIKAELA inguinal hernia repair, carotid artery (), UroLift (11/3/20)   FHx:  No family hx of cancer.   SHx:  Continues to smoke - 1PPD. started 1966 - 57 year hx, no illicit drug use    Treatment Summary:  Adjuvant carboplatin/Gemzar and RT completed 2018  current - yearly surveillance     Interval History (2024):  As above.  Initial Consultation.      ROS:  Patient denies the below review of systems, except for changes as noted in the interval history.    General:  Fatigue, anorexia, fevers, sweats, chills, heat/cold intolerance, weight changes.  HEENT:  Icterus, congestion, sore throat,  rhinorrhea, taste change, voice changes.  Neurology:  Headache, dizziness, vision changes, hearing changes, other motor/sensory loss, gait instability, neuropathies.  Pulmonology:  Cough, wheezing, hemoptysis, dyspnea at rest, dyspnea on exertion, pleuritic chest pain.  Cardiology:  Chest pain, palpitations, orthopnea, paroxysmal nocturnal dyspnea.  Gastroenterology:  Nausea, vomiting, reflux symptoms, abdominal pain, constipation, diarrhea, melena, bright red blood per rectum.  Genitourinary:  Dysuria, polyuria, urine color change, urine smell change, hematuria.   Musculoskeletal:  Arthralgias, myalgias, joint swelling, focal weakness.  Skin:  Rash, pruritis, jaundice, petechiae, nail changes, skin nodules/growth.  Psychology:  Anxiety, depression, suicidal ideation, homicidal ideation.  Heme:  Easy bleeding or bruising.  Others:   All other systems reviewed and are negative.    Medical History:   Past Medical History:   Diagnosis Date    Arthritis     Cancer (Multi)     Cataract     Disease of thyroid gland     Encounter for screening for malignant neoplasm of prostate     Screening PSA (prostate specific antigen)    Glaucoma     Hyperlipidemia, unspecified 12/08/2013    Hyperlipidemia    Hypertension     Personal history of colonic polyps     History of colonic polyps    Stroke (Multi)        Surgical History:   Past Surgical History:   Procedure Laterality Date    CHOLECYSTECTOMY  02/10/2014    Cholecystectomy    CT GUIDED IMAGING FOR NEEDLE PLACEMENT  3/28/2018    CT GUIDED IMAGING FOR NEEDLE PLACEMENT [facility]    ESOPHAGOGASTRODUODENOSCOPY  02/10/2014    Diagnostic Esophagogastroduodenoscopy    HERNIA REPAIR      JOINT REPLACEMENT      KNEE ARTHROSCOPY W/ DEBRIDEMENT  02/10/2014    Arthroscopy Knee    MR HEAD ANGIO WO IV CONTRAST  5/8/2018    MR HEAD ANGIO WO IV CONTRAST [facility]    OTHER SURGICAL HISTORY  02/10/2014    Type Of Stress Test    PROSTATE SURGERY      REFRACTIVE SURGERY   02/10/2014    Corneal LASIK    ROTATOR CUFF REPAIR  02/10/2014    Rotator Cuff Repair    TOTAL KNEE ARTHROPLASTY  02/10/2014    Total Knee Arthroplasty       Family History:  No family history on file.    Allergies:  No Known Allergies    Meds (Current):    Current Outpatient Medications:     amLODIPine (Norvasc) 5 mg tablet, TAKE 1 TABLET BY MOUTH EVERY DAY, Disp: 90 tablet, Rfl: 1    aspirin 81 mg EC tablet, Take 1 tablet (81 mg) by mouth once daily., Disp: , Rfl:     atorvastatin (Lipitor) 40 mg tablet, TAKE 1 TABLET BY MOUTH EVERY DAY, Disp: 90 tablet, Rfl: 1    Blood glucose monitoring meter kit kit, Test daily before all meals/snacks and once before bedtime., Disp: 1 each, Rfl: 0    blood sugar diagnostic (OneTouch Verio test strips) strip, 1 strip once daily. Check blood glucose once a day, Disp: 50 strip, Rfl: 2    clopidogrel (Plavix) 75 mg tablet, TAKE 1 TABLET BY MOUTH ONCE DAILY., Disp: 90 tablet, Rfl: 1    ergocalciferol (Vitamin D-2) 1.25 MG (49499 UT) capsule, Take 1 capsule (1,250 mcg) by mouth 1 (one) time per week., Disp: , Rfl:     gabapentin (Neurontin) 600 mg tablet, Take by mouth., Disp: , Rfl:     lancets (OneTouch Delica Plus Lancet) 33 gauge misc, Glucose monitoring, Disp: 30 each, Rfl: 0    latanoprost (Xalatan) 0.005 % ophthalmic solution, 1 drop once daily at bedtime., Disp: , Rfl:     levETIRAcetam (Keppra) 250 mg tablet, TAKE 1 TABLET TWICE A DAY, Disp: 180 tablet, Rfl: 3    levothyroxine (Synthroid, Levoxyl) 125 mcg tablet, Take 1 tablet (125 mcg) by mouth once daily in the morning. Take before meals., Disp: 90 tablet, Rfl: 1    OneTouch Ultra Test strip, 1 strip once daily., Disp: 30 strip, Rfl: 2    predniSONE (Deltasone) 5 mg tablet, TAKE 1 TABLET BY MOUTH EVERY DAY, Disp: 90 tablet, Rfl: 1    brimonidine (AlphaGAN P) 0.2 % ophthalmic solution, 1 drop 2 times a day., Disp: , Rfl:     dorzolamide (Trusopt) 2 % ophthalmic solution, 1 drop 2 times a day., Disp: , Rfl:     famotidine  "(Pepcid) 40 mg tablet, Take by mouth., Disp: , Rfl:     glimepiride (AmaryL) 4 mg tablet, Take 1 tablet (4 mg) by mouth once daily in the morning. Take before meals. (Patient not taking: Reported on 5/9/2024), Disp: 30 tablet, Rfl: 3    lovastatin (Mevacor) 40 mg tablet, Take 1 tablet (40 mg) by mouth once daily., Disp: , Rfl:     sodium bicarbonate 650 mg tablet, Take 1 tablet (650 mg) by mouth 2 times a day., Disp: , Rfl:     Pain Assessment:  Pain is:0     Performance Status (ECOG):1         ECOG Definition  0 Fully active; no performance restrictions.  1 Strenuous physical activity restricted; fully ambulatory and able to carry out light work.  2 Capable of all self-care but unable to carry out any work activities. Up and about >50% of waking hours.  3 Capable of only limited self-care; confined to bed or chair >50% of waking hours.  4 Completely disabled; cannot carry out any self-care; totally confined to bed or chair.      Exam:  /73 (BP Location: Left arm, Patient Position: Sitting, BP Cuff Size: Adult long)   Temp 36.1 °C (97 °F) (Temporal)   Resp 18   Ht (S) 1.74 m (5' 8.5\")   Wt 71.7 kg (158 lb 1.1 oz)   BMI 23.68 kg/m²   General: Well appearing, no acute distress  Neurology: Alert and oriented x3, CN II-XII grossly intact  Psychology: Affect appropriate  Eyes: PERRL, EOMI  ENT: Mucus membranes moist and intact, no ulcers  Lymphatics: No palpable adenopathy  Neck: Supple, no masses  Respiratory: Lungs clear bilaterally, no wheezes, no rales, no rhonchi  Cardiovascular: Normal rate and rhythm, no murmur  Abdomen: Soft, non-tender, non-distended, normoactive, no hepatosplenomegaly, no ascites  Musculoskeletal: Normal gait and stance  Extremities: No clubbing, no cyanosis, no edema  Skin: No rash  Genitourinary: Deferred  Rectal: Deferred   Pelvic: Deferred  Breasts: Deferred    Labs:  No visits with results within 3 Day(s) from this visit.   Latest known visit with results is:   [facility] " Outpatient Visit on 04/05/2024   Component Date Value Ref Range Status    Glucose 04/04/2024 290 (H)  74 - 99 mg/dL Final    Sodium 04/04/2024 137  136 - 145 mmol/L Final    Potassium 04/04/2024 4.4  3.5 - 5.3 mmol/L Final    Chloride 04/04/2024 106  98 - 107 mmol/L Final    Bicarbonate 04/04/2024 23  21 - 32 mmol/L Final    Anion Gap 04/04/2024 12  10 - 20 mmol/L Final    Urea Nitrogen 04/04/2024 25 (H)  6 - 23 mg/dL Final    Creatinine 04/04/2024 1.80 (H)  0.50 - 1.30 mg/dL Final    eGFR 04/04/2024 39 (L)  >60 mL/min/1.73m*2 Final    Calculations of estimated GFR are performed using the 2021 CKD-EPI Study Refit equation without the race variable for the IDMS-Traceable creatinine methods.  https://jasn.asnjournals.org/content/early/2021/09/22/ASN.5642005771    Calcium 04/04/2024 8.9  8.6 - 10.3 mg/dL Final    Albumin 04/04/2024 3.9  3.4 - 5.0 g/dL Final    Alkaline Phosphatase 04/04/2024 103  33 - 136 U/L Final    Total Protein 04/04/2024 6.2 (L)  6.4 - 8.2 g/dL Final    AST 04/04/2024 14  9 - 39 U/L Final    Bilirubin, Total 04/04/2024 0.4  0.0 - 1.2 mg/dL Final    ALT 04/04/2024 20  10 - 52 U/L Final    Patients treated with Sulfasalazine may generate falsely decreased results for ALT.    WBC 04/04/2024 9.1  4.4 - 11.3 x10*3/uL Final    nRBC 04/04/2024 0.0  0.0 - 0.0 /100 WBCs Final    RBC 04/04/2024 4.15 (L)  4.50 - 5.90 x10*6/uL Final    Hemoglobin 04/04/2024 13.3 (L)  13.5 - 17.5 g/dL Final    Hematocrit 04/04/2024 38.9 (L)  41.0 - 52.0 % Final    MCV 04/04/2024 94  80 - 100 fL Final    MCH 04/04/2024 32.0  26.0 - 34.0 pg Final    MCHC 04/04/2024 34.2  32.0 - 36.0 g/dL Final    RDW 04/04/2024 12.6  11.5 - 14.5 % Final    Platelets 04/04/2024 247  150 - 450 x10*3/uL Final    Neutrophils % 04/04/2024 85.9  40.0 - 80.0 % Final    Immature Granulocytes %, Automated 04/04/2024 0.4  0.0 - 0.9 % Final    Immature Granulocyte Count (IG) includes promyelocytes, myelocytes and metamyelocytes but does not include bands.  Percent differential counts (%) should be interpreted in the context of the absolute cell counts (cells/UL).    Lymphocytes % 04/04/2024 7.3  13.0 - 44.0 % Final    Monocytes % 04/04/2024 5.4  2.0 - 10.0 % Final    Eosinophils % 04/04/2024 0.6  0.0 - 6.0 % Final    Basophils % 04/04/2024 0.4  0.0 - 2.0 % Final    Neutrophils Absolute 04/04/2024 7.78 (H)  1.60 - 5.50 x10*3/uL Final    Percent differential counts (%) should be interpreted in the context of the absolute cell counts (cells/uL).    Immature Granulocytes Absolute, Au* 04/04/2024 0.04  0.00 - 0.50 x10*3/uL Final    Lymphocytes Absolute 04/04/2024 0.66 (L)  0.80 - 3.00 x10*3/uL Final    Monocytes Absolute 04/04/2024 0.49  0.05 - 0.80 x10*3/uL Final    Eosinophils Absolute 04/04/2024 0.05  0.00 - 0.40 x10*3/uL Final    Basophils Absolute 04/04/2024 0.04  0.00 - 0.10 x10*3/uL Final        Assessment/Plan:   74 y.o. male with past medical history as stated referred for management of lung cancer    The patient's records and imaging have been reviewed in detail.  I have discussed with the patient the natural history of their disease at length.  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 completed 11/2018. ADRIAN.     - Interval Imaging: CT  4/5/2024 chest (-) 1. Redemonstration of postsurgical changes in the right lung status post right upper lobectomy as well as presence of a dense lung consolidation posteriorly on the right, unchanged from the prior. No new suspicious lesion seen. 2. Redemonstration of several nodular opacities bilaterally, unchanged. Emphysematous changes present as well with reticulation and septal thickening bilaterally.    # Clinical Trials:  None currently.     # Access: Peripheral veins.    # Pain: No acute pain.    # Bone Health: No signs of bony disease.     # Psychosocial: Coping  well, no acute issues.  Good support from family & friends.  Social work support offered.    # GI/CINV: No acute issues.  Eating and voiding well.  Nutrition consult offered.    # Smoking: N/A, current smoker. No intention to quit. No interest in nicotine patch or gums. Smoking cessation counseling provided.    # Dispo: RTC in 1 year

## 2024-05-09 NOTE — PROGRESS NOTES
Patient here today for follow up and review of recent imaging. His wife is present as well.     Fatigue: denies  PO intake: adequate  Weight: stable per patient  N/V/D/C: denies    Medications reviewed with patient.     Follow up in 1 year with imaging.

## 2024-05-09 NOTE — PATIENT INSTRUCTIONS
Please schedule a CT scan and follow up visit in 1 year.     Please feel free to call with any questions or concerns at [phone].

## 2024-05-13 DIAGNOSIS — E11.8 TYPE 2 DIABETES MELLITUS WITH UNSPECIFIED COMPLICATIONS (MULTI): ICD-10-CM

## 2024-05-13 RX ORDER — LANCETS 33 GAUGE
EACH MISCELLANEOUS
Qty: 50 EACH | Refills: 1 | Status: SHIPPED | OUTPATIENT
Start: 2024-05-13

## 2024-06-06 ENCOUNTER — TELEPHONE (OUTPATIENT)
Dept: PRIMARY CARE | Facility: CLINIC | Age: 74
End: 2024-06-06
Payer: COMMERCIAL

## 2024-06-26 DIAGNOSIS — E55.9 VITAMIN D DEFICIENCY, UNSPECIFIED: ICD-10-CM

## 2024-06-26 RX ORDER — ERGOCALCIFEROL 1.25 MG/1
1 CAPSULE ORAL
Qty: 12 CAPSULE | Refills: 1 | Status: SHIPPED | OUTPATIENT
Start: 2024-06-30

## 2024-06-27 ENCOUNTER — LAB (OUTPATIENT)
Dept: LAB | Facility: LAB | Age: 74
End: 2024-06-27
Payer: COMMERCIAL

## 2024-06-27 DIAGNOSIS — Z13.6 SCREENING FOR CARDIOVASCULAR CONDITION: ICD-10-CM

## 2024-06-27 DIAGNOSIS — E11.22 TYPE 2 DM WITH CKD STAGE 3 AND HYPERTENSION (MULTI): ICD-10-CM

## 2024-06-27 DIAGNOSIS — N18.30 TYPE 2 DM WITH CKD STAGE 3 AND HYPERTENSION (MULTI): ICD-10-CM

## 2024-06-27 DIAGNOSIS — Z13.1 DIABETES MELLITUS SCREENING: ICD-10-CM

## 2024-06-27 DIAGNOSIS — I12.9 TYPE 2 DM WITH CKD STAGE 3 AND HYPERTENSION (MULTI): ICD-10-CM

## 2024-06-27 DIAGNOSIS — E03.9 HYPOTHYROIDISM, UNSPECIFIED TYPE: ICD-10-CM

## 2024-06-27 DIAGNOSIS — Z12.5 SCREENING FOR PROSTATE CANCER: ICD-10-CM

## 2024-06-27 LAB
CHOLEST SERPL-MCNC: 156 MG/DL (ref 0–199)
CHOLESTEROL/HDL RATIO: 2.5
EST. AVERAGE GLUCOSE BLD GHB EST-MCNC: 143 MG/DL
HBA1C MFR BLD: 6.6 %
HDLC SERPL-MCNC: 62.4 MG/DL
LDLC SERPL CALC-MCNC: 73 MG/DL
NON HDL CHOLESTEROL: 94 MG/DL (ref 0–149)
PSA SERPL-MCNC: 1.83 NG/ML
TRIGL SERPL-MCNC: 105 MG/DL (ref 0–149)
TSH SERPL-ACNC: 0.05 MIU/L (ref 0.44–3.98)
VLDL: 21 MG/DL (ref 0–40)

## 2024-06-27 PROCEDURE — 36415 COLL VENOUS BLD VENIPUNCTURE: CPT

## 2024-06-27 PROCEDURE — G0103 PSA SCREENING: HCPCS

## 2024-06-27 PROCEDURE — 84443 ASSAY THYROID STIM HORMONE: CPT

## 2024-06-27 PROCEDURE — 83036 HEMOGLOBIN GLYCOSYLATED A1C: CPT

## 2024-06-27 PROCEDURE — 80061 LIPID PANEL: CPT

## 2024-06-28 DIAGNOSIS — M06.9 RHEUMATOID ARTHRITIS INVOLVING MULTIPLE SITES, UNSPECIFIED WHETHER RHEUMATOID FACTOR PRESENT (MULTI): ICD-10-CM

## 2024-06-28 DIAGNOSIS — E03.9 HYPOTHYROIDISM, UNSPECIFIED TYPE: ICD-10-CM

## 2024-06-28 RX ORDER — PREDNISONE 5 MG/1
5 TABLET ORAL DAILY
Qty: 90 TABLET | Refills: 1 | Status: SHIPPED | OUTPATIENT
Start: 2024-06-28

## 2024-06-28 RX ORDER — LEVOTHYROXINE SODIUM 125 UG/1
125 TABLET ORAL
Qty: 90 TABLET | Refills: 1 | Status: SHIPPED | OUTPATIENT
Start: 2024-06-28 | End: 2025-06-28

## 2024-07-02 ENCOUNTER — APPOINTMENT (OUTPATIENT)
Dept: RADIOLOGY | Facility: HOSPITAL | Age: 74
End: 2024-07-02
Payer: COMMERCIAL

## 2024-07-10 ENCOUNTER — OFFICE VISIT (OUTPATIENT)
Dept: RHEUMATOLOGY | Facility: CLINIC | Age: 74
End: 2024-07-10
Payer: COMMERCIAL

## 2024-07-10 VITALS — WEIGHT: 154 LBS | SYSTOLIC BLOOD PRESSURE: 104 MMHG | DIASTOLIC BLOOD PRESSURE: 56 MMHG | BODY MASS INDEX: 23.07 KG/M2

## 2024-07-10 DIAGNOSIS — M06.9 RHEUMATOID ARTHRITIS INVOLVING MULTIPLE SITES, UNSPECIFIED WHETHER RHEUMATOID FACTOR PRESENT (MULTI): Primary | ICD-10-CM

## 2024-07-10 DIAGNOSIS — M81.0 OSTEOPOROSIS, SENILE: ICD-10-CM

## 2024-07-10 DIAGNOSIS — M15.9 OSTEOARTHRITIS OF MULTIPLE JOINTS, UNSPECIFIED OSTEOARTHRITIS TYPE: ICD-10-CM

## 2024-07-10 PROCEDURE — 99214 OFFICE O/P EST MOD 30 MIN: CPT | Performed by: INTERNAL MEDICINE

## 2024-07-10 PROCEDURE — 1159F MED LIST DOCD IN RCRD: CPT | Performed by: INTERNAL MEDICINE

## 2024-07-10 NOTE — PROGRESS NOTES
Recheck  OA  /  RA  /  OP  due for Dexa    C/O right wrist flare x 2 days followed by right shoulder x 2 days.  Resolved now.    HPI - he had a wrist flare x 2d, then shoulder pain x 2d, now resolved.  No current pain/swelling.  No other flares.  No CP, resp, or GI.      PE  NAD  RRR no r/m/g  CTA  Tr BLE edema  Mild NT synovial thickening R 2nd/3rd MCPs, R 3rd finger PIP, and L 1st MCP    A/P - OA and RA - on low-dose pred because declining DMARDs.  He will call if sx incr  OP - on drug holiday from prolia followed by reclast.  He declines DEXA - I expl reason to check, risk factors, etc - he refuses  Reeval 6 mo

## 2024-07-11 ENCOUNTER — APPOINTMENT (OUTPATIENT)
Dept: PRIMARY CARE | Facility: CLINIC | Age: 74
End: 2024-07-11
Payer: COMMERCIAL

## 2024-07-17 PROBLEM — J44.1 ACUTE INFECTIVE EXACERBATION OF CHRONIC OBSTRUCTIVE AIRWAY DISEASE (MULTI): Status: ACTIVE | Noted: 2024-07-17

## 2024-07-17 PROBLEM — J96.00 ACUTE RESPIRATORY FAILURE (MULTI): Status: ACTIVE | Noted: 2024-07-17

## 2024-07-17 PROBLEM — K21.00 GASTROESOPHAGEAL REFLUX DISEASE WITH ESOPHAGITIS: Status: ACTIVE | Noted: 2024-07-17

## 2024-07-17 PROBLEM — N40.1 BENIGN PROSTATIC HYPERPLASIA (BPH) WITH URINARY URGENCY: Status: ACTIVE | Noted: 2024-07-17

## 2024-07-17 PROBLEM — I65.23 ATHEROSCLEROSIS OF BOTH CAROTID ARTERIES: Status: ACTIVE | Noted: 2024-07-17

## 2024-07-17 PROBLEM — Z85.118 HISTORY OF LUNG CANCER: Status: ACTIVE | Noted: 2024-07-17

## 2024-07-17 PROBLEM — H40.9 GLAUCOMA: Status: ACTIVE | Noted: 2024-07-17

## 2024-07-17 PROBLEM — R39.15 BENIGN PROSTATIC HYPERPLASIA (BPH) WITH URINARY URGENCY: Status: ACTIVE | Noted: 2024-07-17

## 2024-07-17 PROBLEM — F43.0 ACUTE STRESS DISORDER: Status: ACTIVE | Noted: 2024-07-17

## 2024-07-18 ENCOUNTER — TELEPHONE (OUTPATIENT)
Dept: PRIMARY CARE | Facility: CLINIC | Age: 74
End: 2024-07-18

## 2024-07-18 ENCOUNTER — APPOINTMENT (OUTPATIENT)
Dept: PRIMARY CARE | Facility: CLINIC | Age: 74
End: 2024-07-18
Payer: COMMERCIAL

## 2024-07-18 ENCOUNTER — LAB (OUTPATIENT)
Dept: LAB | Facility: LAB | Age: 74
End: 2024-07-18
Payer: COMMERCIAL

## 2024-07-18 VITALS
SYSTOLIC BLOOD PRESSURE: 140 MMHG | HEART RATE: 62 BPM | HEIGHT: 68 IN | WEIGHT: 156 LBS | DIASTOLIC BLOOD PRESSURE: 78 MMHG | BODY MASS INDEX: 23.64 KG/M2

## 2024-07-18 DIAGNOSIS — I10 PRIMARY HYPERTENSION: ICD-10-CM

## 2024-07-18 DIAGNOSIS — E11.22 TYPE 2 DIABETES MELLITUS WITH STAGE 3B CHRONIC KIDNEY DISEASE, WITHOUT LONG-TERM CURRENT USE OF INSULIN (MULTI): ICD-10-CM

## 2024-07-18 DIAGNOSIS — E11.22 TYPE 2 DIABETES MELLITUS WITH STAGE 3B CHRONIC KIDNEY DISEASE, WITHOUT LONG-TERM CURRENT USE OF INSULIN (MULTI): Primary | ICD-10-CM

## 2024-07-18 DIAGNOSIS — N18.32 TYPE 2 DIABETES MELLITUS WITH STAGE 3B CHRONIC KIDNEY DISEASE, WITHOUT LONG-TERM CURRENT USE OF INSULIN (MULTI): ICD-10-CM

## 2024-07-18 DIAGNOSIS — N18.32 TYPE 2 DIABETES MELLITUS WITH STAGE 3B CHRONIC KIDNEY DISEASE, WITHOUT LONG-TERM CURRENT USE OF INSULIN (MULTI): Primary | ICD-10-CM

## 2024-07-18 DIAGNOSIS — E03.9 ACQUIRED HYPOTHYROIDISM: ICD-10-CM

## 2024-07-18 DIAGNOSIS — I73.9 PERIPHERAL VASCULAR DISEASE (CMS-HCC): ICD-10-CM

## 2024-07-18 PROBLEM — E11.29 TYPE 2 DIABETES MELLITUS WITH KIDNEY COMPLICATION, WITHOUT LONG-TERM CURRENT USE OF INSULIN (MULTI): Status: ACTIVE | Noted: 2023-07-11

## 2024-07-18 LAB
ALBUMIN SERPL BCP-MCNC: 3.8 G/DL (ref 3.4–5)
ALP SERPL-CCNC: 88 U/L (ref 33–136)
ALT SERPL W P-5'-P-CCNC: 20 U/L (ref 10–52)
ANION GAP SERPL CALC-SCNC: 13 MMOL/L (ref 10–20)
AST SERPL W P-5'-P-CCNC: 16 U/L (ref 9–39)
BILIRUB SERPL-MCNC: 0.6 MG/DL (ref 0–1.2)
BUN SERPL-MCNC: 28 MG/DL (ref 6–23)
CALCIUM SERPL-MCNC: 9.1 MG/DL (ref 8.6–10.6)
CHLORIDE SERPL-SCNC: 108 MMOL/L (ref 98–107)
CO2 SERPL-SCNC: 24 MMOL/L (ref 21–32)
CREAT SERPL-MCNC: 1.73 MG/DL (ref 0.5–1.3)
CREAT UR-MCNC: 216.4 MG/DL (ref 20–370)
EGFRCR SERPLBLD CKD-EPI 2021: 40 ML/MIN/1.73M*2
GLUCOSE SERPL-MCNC: 257 MG/DL (ref 74–99)
MICROALBUMIN UR-MCNC: 219 MG/L
MICROALBUMIN/CREAT UR: 101.2 UG/MG CREAT
POTASSIUM SERPL-SCNC: 4.7 MMOL/L (ref 3.5–5.3)
PROT SERPL-MCNC: 6 G/DL (ref 6.4–8.2)
SODIUM SERPL-SCNC: 140 MMOL/L (ref 136–145)
T4 FREE SERPL-MCNC: 1.62 NG/DL (ref 0.78–1.48)
TSH SERPL-ACNC: 0.07 MIU/L (ref 0.44–3.98)

## 2024-07-18 PROCEDURE — 1158F ADVNC CARE PLAN TLK DOCD: CPT

## 2024-07-18 PROCEDURE — 82043 UR ALBUMIN QUANTITATIVE: CPT

## 2024-07-18 PROCEDURE — 1123F ACP DISCUSS/DSCN MKR DOCD: CPT

## 2024-07-18 PROCEDURE — 80053 COMPREHEN METABOLIC PANEL: CPT

## 2024-07-18 PROCEDURE — 1160F RVW MEDS BY RX/DR IN RCRD: CPT

## 2024-07-18 PROCEDURE — 84443 ASSAY THYROID STIM HORMONE: CPT

## 2024-07-18 PROCEDURE — 84439 ASSAY OF FREE THYROXINE: CPT

## 2024-07-18 PROCEDURE — 36415 COLL VENOUS BLD VENIPUNCTURE: CPT

## 2024-07-18 PROCEDURE — 99204 OFFICE O/P NEW MOD 45 MIN: CPT

## 2024-07-18 PROCEDURE — 4004F PT TOBACCO SCREEN RCVD TLK: CPT

## 2024-07-18 PROCEDURE — 3078F DIAST BP <80 MM HG: CPT

## 2024-07-18 PROCEDURE — 1126F AMNT PAIN NOTED NONE PRSNT: CPT

## 2024-07-18 PROCEDURE — 1159F MED LIST DOCD IN RCRD: CPT

## 2024-07-18 PROCEDURE — 3077F SYST BP >= 140 MM HG: CPT

## 2024-07-18 PROCEDURE — 82570 ASSAY OF URINE CREATININE: CPT

## 2024-07-18 ASSESSMENT — ENCOUNTER SYMPTOMS
SHORTNESS OF BREATH: 0
FEVER: 0
NAUSEA: 0
DIZZINESS: 0
VOMITING: 0
CHILLS: 0
HEADACHES: 0
PALPITATIONS: 0
LIGHT-HEADEDNESS: 0

## 2024-07-18 ASSESSMENT — PAIN SCALES - GENERAL: PAINLEVEL: 0-NO PAIN

## 2024-07-18 ASSESSMENT — PATIENT HEALTH QUESTIONNAIRE - PHQ9
2. FEELING DOWN, DEPRESSED OR HOPELESS: NOT AT ALL
SUM OF ALL RESPONSES TO PHQ9 QUESTIONS 1 AND 2: 0
1. LITTLE INTEREST OR PLEASURE IN DOING THINGS: NOT AT ALL

## 2024-07-18 ASSESSMENT — COLUMBIA-SUICIDE SEVERITY RATING SCALE - C-SSRS: 1. IN THE PAST MONTH, HAVE YOU WISHED YOU WERE DEAD OR WISHED YOU COULD GO TO SLEEP AND NOT WAKE UP?: NO

## 2024-07-18 NOTE — ASSESSMENT & PLAN NOTE
Chronic.  Patient discontinued glimepiride use due to side effects, not currently on medication.  Labs ordered, will follow-up with results.  Discussed restarting low-dose glimepiride versus Farxiga pending kidney function.  Low carbohydrate diet and increase in activity.  Recheck in 3 months.

## 2024-07-18 NOTE — ASSESSMENT & PLAN NOTE
Chronic.  Most recent TSH abnormal, labs ordered will follow-up with results.  Continue levothyroxine 125 mcg at this time.

## 2024-07-18 NOTE — ASSESSMENT & PLAN NOTE
Chronic.  Continue Lipitor 40 mg, Plavix 75 mg daily.  Encourage 30 minutes of exercise, 5 times per week.  Discussed smoking cessation, patient declines at this time.

## 2024-07-18 NOTE — ASSESSMENT & PLAN NOTE
Chronic.  Continue amlodipine 5 mg.  DASH diet and increase in activity.  Check home BP and keep log.

## 2024-07-18 NOTE — PROGRESS NOTES
"Subjective   Patient ID: Jose Manuel Andujar is a 74 y.o. male who presents for New Patient Visit.    HPI   Jose Manuel is a new patient here to establish care and seen for med review. PMH, FH, SH, medications reviewed and updated with patient.     DM: On no medications. Last A1C 6.6. Sugar levels . Last microalbumin - N/A and GFR - 39. On statin. Last eye exam - every 6 months. Denies hypoglycemic incidents, chest pain, shortness of breath, headache.     HTN: On Amlodipine 5mg. No medication side effects. Home /70's. Denies chest pain, heart palpitations, SOB, dizziness, headaches, changes of vision, syncope, leg swelling.     Hypothyroidism: On Levothyroxine 25mcg daily. Recent TSH 0.05, no T4 drawn.     Review of Systems   Constitutional:  Negative for chills and fever.   Respiratory:  Negative for shortness of breath.    Cardiovascular:  Negative for chest pain, palpitations and leg swelling.   Gastrointestinal:  Negative for nausea and vomiting.   Neurological:  Negative for dizziness, light-headedness and headaches.   All other systems reviewed and are negative.    Objective   /78   Pulse 62   Ht 1.727 m (5' 8\")   Wt 70.8 kg (156 lb)   BMI 23.72 kg/m²     Physical Exam  Vitals and nursing note reviewed.   Constitutional:       General: He is not in acute distress.  Eyes:      Extraocular Movements: Extraocular movements intact.      Conjunctiva/sclera: Conjunctivae normal.   Neck:      Vascular: No carotid bruit.   Cardiovascular:      Rate and Rhythm: Normal rate and regular rhythm.   Pulmonary:      Effort: Pulmonary effort is normal.      Breath sounds: Normal breath sounds.   Musculoskeletal:      Cervical back: Neck supple.      Right lower leg: No edema.      Left lower leg: No edema.   Neurological:      General: No focal deficit present.      Mental Status: He is alert.   Psychiatric:         Mood and Affect: Mood normal.       Assessment/Plan   Problem List Items Addressed This Visit          "    ICD-10-CM    Peripheral vascular disease (CMS-HCC) I73.9     Chronic.  Continue Lipitor 40 mg, Plavix 75 mg daily.  Encourage 30 minutes of exercise, 5 times per week.  Discussed smoking cessation, patient declines at this time.         Hypothyroidism E03.9     Chronic.  Most recent TSH abnormal, labs ordered will follow-up with results.  Continue levothyroxine 125 mcg at this time.           Relevant Orders    Tsh With Reflex To Free T4 If Abnormal    Type 2 diabetes mellitus with kidney complication, without long-term current use of insulin (Multi) - Primary E11.29     Chronic.  Patient discontinued glimepiride use due to side effects, not currently on medication.  Labs ordered, will follow-up with results.  Discussed restarting low-dose glimepiride versus Farxiga pending kidney function.  Low carbohydrate diet and increase in activity.  Recheck in 3 months.         Relevant Orders    Comprehensive metabolic panel    Albumin-Creatinine Ratio, Urine Random    Primary hypertension I10     Chronic.  Continue amlodipine 5 mg.  DASH diet and increase in activity.  Check home BP and keep log.         Relevant Orders    Comprehensive metabolic panel    Albumin-Creatinine Ratio, Urine Random

## 2024-08-05 ENCOUNTER — APPOINTMENT (OUTPATIENT)
Dept: PRIMARY CARE | Facility: CLINIC | Age: 74
End: 2024-08-05
Payer: COMMERCIAL

## 2024-08-05 VITALS
SYSTOLIC BLOOD PRESSURE: 122 MMHG | HEART RATE: 77 BPM | DIASTOLIC BLOOD PRESSURE: 70 MMHG | OXYGEN SATURATION: 100 % | WEIGHT: 151 LBS | BODY MASS INDEX: 22.96 KG/M2 | RESPIRATION RATE: 18 BRPM

## 2024-08-05 DIAGNOSIS — E11.22 TYPE 2 DIABETES MELLITUS WITH STAGE 3B CHRONIC KIDNEY DISEASE, WITHOUT LONG-TERM CURRENT USE OF INSULIN (MULTI): ICD-10-CM

## 2024-08-05 DIAGNOSIS — E03.9 ACQUIRED HYPOTHYROIDISM: Primary | ICD-10-CM

## 2024-08-05 DIAGNOSIS — I10 PRIMARY HYPERTENSION: ICD-10-CM

## 2024-08-05 DIAGNOSIS — N18.32 TYPE 2 DIABETES MELLITUS WITH STAGE 3B CHRONIC KIDNEY DISEASE, WITHOUT LONG-TERM CURRENT USE OF INSULIN (MULTI): ICD-10-CM

## 2024-08-05 PROCEDURE — 3074F SYST BP LT 130 MM HG: CPT

## 2024-08-05 PROCEDURE — 1160F RVW MEDS BY RX/DR IN RCRD: CPT

## 2024-08-05 PROCEDURE — 1159F MED LIST DOCD IN RCRD: CPT

## 2024-08-05 PROCEDURE — 1126F AMNT PAIN NOTED NONE PRSNT: CPT

## 2024-08-05 PROCEDURE — 99214 OFFICE O/P EST MOD 30 MIN: CPT

## 2024-08-05 PROCEDURE — 4004F PT TOBACCO SCREEN RCVD TLK: CPT

## 2024-08-05 PROCEDURE — 3078F DIAST BP <80 MM HG: CPT

## 2024-08-05 RX ORDER — LEVOTHYROXINE SODIUM 100 UG/1
100 TABLET ORAL DAILY
Qty: 90 TABLET | Refills: 0 | Status: SHIPPED | OUTPATIENT
Start: 2024-08-05 | End: 2024-11-03

## 2024-08-05 RX ORDER — LISINOPRIL 5 MG/1
5 TABLET ORAL DAILY
Qty: 90 TABLET | Refills: 0 | Status: SHIPPED | OUTPATIENT
Start: 2024-08-05 | End: 2024-11-03

## 2024-08-05 ASSESSMENT — ENCOUNTER SYMPTOMS
LOSS OF SENSATION IN FEET: 0
DEPRESSION: 0
OCCASIONAL FEELINGS OF UNSTEADINESS: 0

## 2024-08-05 ASSESSMENT — PATIENT HEALTH QUESTIONNAIRE - PHQ9
SUM OF ALL RESPONSES TO PHQ9 QUESTIONS 1 AND 2: 0
2. FEELING DOWN, DEPRESSED OR HOPELESS: NOT AT ALL
1. LITTLE INTEREST OR PLEASURE IN DOING THINGS: NOT AT ALL

## 2024-08-05 ASSESSMENT — PAIN SCALES - GENERAL: PAINLEVEL: 0-NO PAIN

## 2024-08-05 NOTE — PROGRESS NOTES
Subjective   Patient ID: Jose Manuel Andujar is a 74 y.o. male who presents for Results (Patient is here to go over his blood work results).    HPI   DM: On no medications. Last A1C 6.6. Sugar levels . Last microalbumin - 101 and GFR - 40. On statin. Last eye exam - every 6 months. Denies hypoglycemic incidents, chest pain, shortness of breath, headache.      HTN: On Amlodipine 5mg. No medication side effects. Home /70's. Denies chest pain, heart palpitations, SOB, dizziness, headaches, changes of vision, syncope, leg swelling.      Hypothyroidism: On Levothyroxine 125mcg daily. Recent TSH 0.07, T4 1.62.     Review of Systems  All other systems have been reviewed and are negative except as noted in the HPI.     Objective   /70 (BP Location: Left arm, Patient Position: Sitting, BP Cuff Size: Large adult)   Pulse 77   Resp 18   Wt 68.5 kg (151 lb)   SpO2 100%   BMI 22.96 kg/m²     Physical Exam  Vitals and nursing note reviewed.   Constitutional:       General: He is not in acute distress.  Eyes:      Extraocular Movements: Extraocular movements intact.      Conjunctiva/sclera: Conjunctivae normal.   Neck:      Vascular: No carotid bruit.   Cardiovascular:      Rate and Rhythm: Normal rate and regular rhythm.   Pulmonary:      Effort: Pulmonary effort is normal.      Breath sounds: Normal breath sounds.   Musculoskeletal:      Cervical back: Neck supple.      Right lower leg: No edema.      Left lower leg: No edema.   Neurological:      General: No focal deficit present.      Mental Status: He is alert.   Psychiatric:         Mood and Affect: Mood normal.       Assessment/Plan   Problem List Items Addressed This Visit             ICD-10-CM    Hypothyroidism - Primary E03.9     Chronic. Supra-therapeutic dose, decrease Levothyroxine to 100mcg. Recheck in 6-8 weeks.          Relevant Medications    levothyroxine (Synthroid, Levoxyl) 100 mcg tablet    Other Relevant Orders    Tsh With Reflex To Free T4  If Abnormal    Type 2 diabetes mellitus with kidney complication, without long-term current use of insulin (Multi) E11.29     Chronic. Keep off medication at this time. Start Lisinopril 5mg. Risks and benefits of medication discussed and prescribed. Increase fluid intake, avoid NSAIDs. Referral to nephrology placed for further evaluation. May consider Farxiga for DM and CKD management.          Relevant Medications    lisinopril 5 mg tablet    Other Relevant Orders    Referral to Nephrology    Hemoglobin A1c    Comprehensive metabolic panel    Primary hypertension I10     Chronic. Discontinue Amlodipine 5mg, start Lisinopril 5mg. Risks and benefits of medication discussed and prescribed. DASH diet and increase in activity. Continue to check home BP and keep log. Recheck at upcoming appointment.          Relevant Orders    Comprehensive metabolic panel

## 2024-08-06 NOTE — ASSESSMENT & PLAN NOTE
Chronic. Keep off medication at this time. Start Lisinopril 5mg. Risks and benefits of medication discussed and prescribed. Increase fluid intake, avoid NSAIDs. Referral to nephrology placed for further evaluation. May consider Farxiga for DM and CKD management.

## 2024-08-06 NOTE — ASSESSMENT & PLAN NOTE
Chronic. Discontinue Amlodipine 5mg, start Lisinopril 5mg. Risks and benefits of medication discussed and prescribed. DASH diet and increase in activity. Continue to check home BP and keep log. Recheck at upcoming appointment.

## 2024-08-20 ENCOUNTER — TELEPHONE (OUTPATIENT)
Dept: PRIMARY CARE | Facility: CLINIC | Age: 74
End: 2024-08-20
Payer: COMMERCIAL

## 2024-08-20 DIAGNOSIS — I73.9 PERIPHERAL VASCULAR DISEASE, UNSPECIFIED (CMS-HCC): ICD-10-CM

## 2024-08-20 RX ORDER — ATORVASTATIN CALCIUM 40 MG/1
40 TABLET, FILM COATED ORAL DAILY
Qty: 90 TABLET | Refills: 0 | Status: SHIPPED | OUTPATIENT
Start: 2024-08-20

## 2024-09-24 DIAGNOSIS — G62.9 POLYNEUROPATHY, UNSPECIFIED: ICD-10-CM

## 2024-09-24 RX ORDER — GABAPENTIN 600 MG/1
600 TABLET ORAL DAILY
Qty: 90 TABLET | Refills: 3 | Status: SHIPPED | OUTPATIENT
Start: 2024-09-24

## 2024-09-30 ENCOUNTER — TELEPHONE (OUTPATIENT)
Dept: PRIMARY CARE | Facility: CLINIC | Age: 74
End: 2024-09-30
Payer: COMMERCIAL

## 2024-09-30 DIAGNOSIS — E11.22 TYPE 2 DIABETES MELLITUS WITH STAGE 3B CHRONIC KIDNEY DISEASE, WITHOUT LONG-TERM CURRENT USE OF INSULIN (MULTI): ICD-10-CM

## 2024-09-30 DIAGNOSIS — N18.32 TYPE 2 DIABETES MELLITUS WITH STAGE 3B CHRONIC KIDNEY DISEASE, WITHOUT LONG-TERM CURRENT USE OF INSULIN (MULTI): ICD-10-CM

## 2024-09-30 RX ORDER — LISINOPRIL 5 MG/1
5 TABLET ORAL DAILY
Qty: 90 TABLET | Refills: 0 | Status: SHIPPED | OUTPATIENT
Start: 2024-09-30 | End: 2024-12-29

## 2024-09-30 NOTE — TELEPHONE ENCOUNTER
Patient wants a refill of Amlodipine 5 mg.  Pharmacy is [facility] on [facility] in [place].    Patient's number:  [phone]

## 2024-10-09 ENCOUNTER — LAB (OUTPATIENT)
Dept: LAB | Facility: LAB | Age: 74
End: 2024-10-09
Payer: COMMERCIAL

## 2024-10-09 DIAGNOSIS — I10 PRIMARY HYPERTENSION: ICD-10-CM

## 2024-10-09 DIAGNOSIS — N18.32 TYPE 2 DIABETES MELLITUS WITH STAGE 3B CHRONIC KIDNEY DISEASE, WITHOUT LONG-TERM CURRENT USE OF INSULIN (MULTI): ICD-10-CM

## 2024-10-09 DIAGNOSIS — E03.9 ACQUIRED HYPOTHYROIDISM: ICD-10-CM

## 2024-10-09 DIAGNOSIS — E11.22 TYPE 2 DIABETES MELLITUS WITH STAGE 3B CHRONIC KIDNEY DISEASE, WITHOUT LONG-TERM CURRENT USE OF INSULIN (MULTI): ICD-10-CM

## 2024-10-09 LAB
ALBUMIN SERPL BCP-MCNC: 4.1 G/DL (ref 3.4–5)
ALP SERPL-CCNC: 108 U/L (ref 33–136)
ALT SERPL W P-5'-P-CCNC: 28 U/L (ref 10–52)
ANION GAP SERPL CALC-SCNC: 17 MMOL/L (ref 10–20)
AST SERPL W P-5'-P-CCNC: 21 U/L (ref 9–39)
BILIRUB SERPL-MCNC: 0.6 MG/DL (ref 0–1.2)
BUN SERPL-MCNC: 31 MG/DL (ref 6–23)
CALCIUM SERPL-MCNC: 9.5 MG/DL (ref 8.6–10.6)
CHLORIDE SERPL-SCNC: 107 MMOL/L (ref 98–107)
CO2 SERPL-SCNC: 23 MMOL/L (ref 21–32)
CREAT SERPL-MCNC: 1.73 MG/DL (ref 0.5–1.3)
EGFRCR SERPLBLD CKD-EPI 2021: 40 ML/MIN/1.73M*2
EST. AVERAGE GLUCOSE BLD GHB EST-MCNC: 134 MG/DL
GLUCOSE SERPL-MCNC: 177 MG/DL (ref 74–99)
HBA1C MFR BLD: 6.3 %
POTASSIUM SERPL-SCNC: 4.7 MMOL/L (ref 3.5–5.3)
PROT SERPL-MCNC: 6.6 G/DL (ref 6.4–8.2)
SODIUM SERPL-SCNC: 142 MMOL/L (ref 136–145)
T4 FREE SERPL-MCNC: 1.56 NG/DL (ref 0.78–1.48)
TSH SERPL-ACNC: 0.3 MIU/L (ref 0.44–3.98)

## 2024-10-09 PROCEDURE — 80053 COMPREHEN METABOLIC PANEL: CPT

## 2024-10-09 PROCEDURE — 84443 ASSAY THYROID STIM HORMONE: CPT

## 2024-10-09 PROCEDURE — 83036 HEMOGLOBIN GLYCOSYLATED A1C: CPT

## 2024-10-09 PROCEDURE — 36415 COLL VENOUS BLD VENIPUNCTURE: CPT

## 2024-10-09 PROCEDURE — 84439 ASSAY OF FREE THYROXINE: CPT

## 2024-10-16 ENCOUNTER — TELEPHONE (OUTPATIENT)
Dept: PRIMARY CARE | Facility: CLINIC | Age: 74
End: 2024-10-16
Payer: COMMERCIAL

## 2024-10-16 DIAGNOSIS — N18.32 TYPE 2 DIABETES MELLITUS WITH STAGE 3B CHRONIC KIDNEY DISEASE, WITHOUT LONG-TERM CURRENT USE OF INSULIN (MULTI): ICD-10-CM

## 2024-10-16 DIAGNOSIS — E11.22 TYPE 2 DIABETES MELLITUS WITH STAGE 3B CHRONIC KIDNEY DISEASE, WITHOUT LONG-TERM CURRENT USE OF INSULIN (MULTI): ICD-10-CM

## 2024-10-18 ENCOUNTER — TELEPHONE (OUTPATIENT)
Dept: PRIMARY CARE | Facility: CLINIC | Age: 74
End: 2024-10-18

## 2024-10-18 ENCOUNTER — APPOINTMENT (OUTPATIENT)
Dept: PRIMARY CARE | Facility: CLINIC | Age: 74
End: 2024-10-18
Payer: COMMERCIAL

## 2024-10-18 VITALS
SYSTOLIC BLOOD PRESSURE: 118 MMHG | BODY MASS INDEX: 24.18 KG/M2 | DIASTOLIC BLOOD PRESSURE: 76 MMHG | WEIGHT: 159 LBS | HEART RATE: 55 BPM | OXYGEN SATURATION: 100 % | TEMPERATURE: 96.3 F

## 2024-10-18 DIAGNOSIS — N18.32 TYPE 2 DIABETES MELLITUS WITH STAGE 3B CHRONIC KIDNEY DISEASE, WITHOUT LONG-TERM CURRENT USE OF INSULIN (MULTI): ICD-10-CM

## 2024-10-18 DIAGNOSIS — I10 PRIMARY HYPERTENSION: ICD-10-CM

## 2024-10-18 DIAGNOSIS — E03.9 ACQUIRED HYPOTHYROIDISM: ICD-10-CM

## 2024-10-18 DIAGNOSIS — E11.22 TYPE 2 DIABETES MELLITUS WITH STAGE 3B CHRONIC KIDNEY DISEASE, WITHOUT LONG-TERM CURRENT USE OF INSULIN (MULTI): ICD-10-CM

## 2024-10-18 DIAGNOSIS — N18.32 TYPE 2 DIABETES MELLITUS WITH STAGE 3B CHRONIC KIDNEY DISEASE, WITHOUT LONG-TERM CURRENT USE OF INSULIN (MULTI): Primary | ICD-10-CM

## 2024-10-18 DIAGNOSIS — E11.22 TYPE 2 DIABETES MELLITUS WITH STAGE 3B CHRONIC KIDNEY DISEASE, WITHOUT LONG-TERM CURRENT USE OF INSULIN (MULTI): Primary | ICD-10-CM

## 2024-10-18 DIAGNOSIS — E55.9 VITAMIN D DEFICIENCY: ICD-10-CM

## 2024-10-18 DIAGNOSIS — E78.5 HYPERLIPIDEMIA, UNSPECIFIED HYPERLIPIDEMIA TYPE: ICD-10-CM

## 2024-10-18 DIAGNOSIS — N18.30 STAGE 3 CHRONIC KIDNEY DISEASE, UNSPECIFIED WHETHER STAGE 3A OR 3B CKD (MULTI): ICD-10-CM

## 2024-10-18 PROCEDURE — 1126F AMNT PAIN NOTED NONE PRSNT: CPT

## 2024-10-18 PROCEDURE — 4004F PT TOBACCO SCREEN RCVD TLK: CPT

## 2024-10-18 PROCEDURE — 99214 OFFICE O/P EST MOD 30 MIN: CPT

## 2024-10-18 PROCEDURE — G0008 ADMIN INFLUENZA VIRUS VAC: HCPCS

## 2024-10-18 PROCEDURE — 3078F DIAST BP <80 MM HG: CPT

## 2024-10-18 PROCEDURE — 1159F MED LIST DOCD IN RCRD: CPT

## 2024-10-18 PROCEDURE — 3074F SYST BP LT 130 MM HG: CPT

## 2024-10-18 PROCEDURE — 90662 IIV NO PRSV INCREASED AG IM: CPT

## 2024-10-18 RX ORDER — LISINOPRIL 5 MG/1
5 TABLET ORAL DAILY
Qty: 90 TABLET | Refills: 0 | Status: SHIPPED | OUTPATIENT
Start: 2024-10-18 | End: 2025-01-16

## 2024-10-18 RX ORDER — LEVOTHYROXINE SODIUM 88 UG/1
88 TABLET ORAL DAILY
Qty: 60 TABLET | Refills: 0 | Status: SHIPPED | OUTPATIENT
Start: 2024-10-18 | End: 2024-12-17

## 2024-10-18 ASSESSMENT — COLUMBIA-SUICIDE SEVERITY RATING SCALE - C-SSRS
6. HAVE YOU EVER DONE ANYTHING, STARTED TO DO ANYTHING, OR PREPARED TO DO ANYTHING TO END YOUR LIFE?: NO
2. HAVE YOU ACTUALLY HAD ANY THOUGHTS OF KILLING YOURSELF?: NO
1. IN THE PAST MONTH, HAVE YOU WISHED YOU WERE DEAD OR WISHED YOU COULD GO TO SLEEP AND NOT WAKE UP?: NO

## 2024-10-18 ASSESSMENT — ENCOUNTER SYMPTOMS
LOSS OF SENSATION IN FEET: 0
DEPRESSION: 0
OCCASIONAL FEELINGS OF UNSTEADINESS: 0

## 2024-10-18 ASSESSMENT — PAIN SCALES - GENERAL: PAINLEVEL_OUTOF10: 0-NO PAIN

## 2024-10-18 NOTE — ASSESSMENT & PLAN NOTE
Chronic. Supra-therapeutic dose, decrease Levothyroxine to 88 mcg. Recheck in 6-8 weeks, lab order placed.     Orders:    levothyroxine (Synthroid, Levoxyl) 88 mcg tablet; Take 1 tablet (88 mcg) by mouth early in the morning.. Take on an empty stomach at the same time each day, either 30 to 60 minutes prior to breakfast    Tsh With Reflex To Free T4 If Abnormal; Future

## 2024-10-18 NOTE — PROGRESS NOTES
Subjective   Patient ID: Jose Manuel Andujar is a 74 y.o. male who presents for Diabetes (Check up), Diabetic Eye Exam, and Med Refill (Pt requesting).    Diabetes    Med Refill       DM: On no medications. Last A1C 6.3. Sugar levels . Last microalbumin - 101 and GFR - 40. On statin/ACEi. Last eye exam - every 6 months. Denies hypoglycemic incidents, chest pain, shortness of breath, headache.     Hypothyroidism: Decreased Levothyroxine to 100 mcg daily. Recent TSH 0.30, T4 1.56.     Review of Systems  All other systems have been reviewed and are negative except as noted in the HPI.     Objective   /76   Pulse 58   Temp 35.7 °C (96.3 °F)   Wt 72.1 kg (159 lb)   BMI 24.18 kg/m²     Physical Exam  Vitals and nursing note reviewed.   Constitutional:       General: He is not in acute distress.  Eyes:      Extraocular Movements: Extraocular movements intact.      Conjunctiva/sclera: Conjunctivae normal.   Neck:      Vascular: No carotid bruit.   Cardiovascular:      Rate and Rhythm: Normal rate and regular rhythm.   Pulmonary:      Effort: Pulmonary effort is normal.      Breath sounds: Normal breath sounds.   Musculoskeletal:      Cervical back: Neck supple.      Right lower leg: No edema.      Left lower leg: No edema.   Neurological:      General: No focal deficit present.      Mental Status: He is alert.   Psychiatric:         Mood and Affect: Mood normal.       Assessment/Plan   Assessment & Plan  Type 2 diabetes mellitus with stage 3b chronic kidney disease, without long-term current use of insulin (Multi)  Chronic. Continue management with diet and lifestyle modifications. Low carbohydrate diet and increase in activity. Referral to nephrology placed, patient states he did not receive call to schedule. Stay hydrated, avoid NSAIDs, continue Lisinopril 5mg. Recheck in 6 months.     Orders:    Comprehensive metabolic panel; Future    lisinopril 5 mg tablet; Take 1 tablet (5 mg) by mouth once daily.     Referral to Nephrology; Future    Acquired hypothyroidism  Chronic. Supra-therapeutic dose, decrease Levothyroxine to 88 mcg. Recheck in 6-8 weeks, lab order placed.     Orders:    levothyroxine (Synthroid, Levoxyl) 88 mcg tablet; Take 1 tablet (88 mcg) by mouth early in the morning.. Take on an empty stomach at the same time each day, either 30 to 60 minutes prior to breakfast    Tsh With Reflex To Free T4 If Abnormal; Future

## 2024-10-18 NOTE — ASSESSMENT & PLAN NOTE
Chronic. Continue management with diet and lifestyle modifications. Low carbohydrate diet and increase in activity. Referral to nephrology placed, patient states he did not receive call to schedule. Stay hydrated, avoid NSAIDs, continue Lisinopril 5mg. Recheck in 6 months.     Orders:    Comprehensive metabolic panel; Future    lisinopril 5 mg tablet; Take 1 tablet (5 mg) by mouth once daily.    Referral to Nephrology; Future

## 2024-11-04 ENCOUNTER — LAB (OUTPATIENT)
Dept: LAB | Facility: LAB | Age: 74
End: 2024-11-04
Payer: COMMERCIAL

## 2024-11-04 DIAGNOSIS — N18.32 TYPE 2 DIABETES MELLITUS WITH STAGE 3B CHRONIC KIDNEY DISEASE, WITHOUT LONG-TERM CURRENT USE OF INSULIN (MULTI): ICD-10-CM

## 2024-11-04 DIAGNOSIS — E03.9 ACQUIRED HYPOTHYROIDISM: ICD-10-CM

## 2024-11-04 DIAGNOSIS — E11.22 TYPE 2 DIABETES MELLITUS WITH STAGE 3B CHRONIC KIDNEY DISEASE, WITHOUT LONG-TERM CURRENT USE OF INSULIN (MULTI): ICD-10-CM

## 2024-11-04 LAB
ALBUMIN SERPL BCP-MCNC: 4.1 G/DL (ref 3.4–5)
ALP SERPL-CCNC: 110 U/L (ref 33–136)
ALT SERPL W P-5'-P-CCNC: 19 U/L (ref 10–52)
ANION GAP SERPL CALC-SCNC: 15 MMOL/L (ref 10–20)
AST SERPL W P-5'-P-CCNC: 15 U/L (ref 9–39)
BILIRUB SERPL-MCNC: 0.8 MG/DL (ref 0–1.2)
BUN SERPL-MCNC: 24 MG/DL (ref 6–23)
CALCIUM SERPL-MCNC: 8.9 MG/DL (ref 8.6–10.6)
CHLORIDE SERPL-SCNC: 108 MMOL/L (ref 98–107)
CO2 SERPL-SCNC: 22 MMOL/L (ref 21–32)
CREAT SERPL-MCNC: 1.74 MG/DL (ref 0.5–1.3)
EGFRCR SERPLBLD CKD-EPI 2021: 40 ML/MIN/1.73M*2
GLUCOSE SERPL-MCNC: 180 MG/DL (ref 74–99)
POTASSIUM SERPL-SCNC: 4.7 MMOL/L (ref 3.5–5.3)
PROT SERPL-MCNC: 6.7 G/DL (ref 6.4–8.2)
SODIUM SERPL-SCNC: 140 MMOL/L (ref 136–145)
TSH SERPL-ACNC: 0.84 MIU/L (ref 0.44–3.98)

## 2024-11-04 PROCEDURE — 84443 ASSAY THYROID STIM HORMONE: CPT

## 2024-11-04 PROCEDURE — 36415 COLL VENOUS BLD VENIPUNCTURE: CPT

## 2024-11-04 PROCEDURE — 80053 COMPREHEN METABOLIC PANEL: CPT

## 2024-11-18 ENCOUNTER — TELEPHONE (OUTPATIENT)
Dept: PRIMARY CARE | Facility: CLINIC | Age: 74
End: 2024-11-18
Payer: COMMERCIAL

## 2024-11-18 DIAGNOSIS — I65.29 CAROTID ARTERY CALCIFICATION, UNSPECIFIED LATERALITY: ICD-10-CM

## 2024-11-18 DIAGNOSIS — I73.9 PERIPHERAL VASCULAR DISEASE, UNSPECIFIED (CMS-HCC): ICD-10-CM

## 2024-11-18 DIAGNOSIS — I73.9 PERIPHERAL VASCULAR DISEASE (CMS-HCC): ICD-10-CM

## 2024-11-18 RX ORDER — ATORVASTATIN CALCIUM 40 MG/1
40 TABLET, FILM COATED ORAL DAILY
Qty: 90 TABLET | Refills: 0 | Status: SHIPPED | OUTPATIENT
Start: 2024-11-18

## 2024-11-18 RX ORDER — CLOPIDOGREL BISULFATE 75 MG/1
75 TABLET ORAL DAILY
Qty: 90 TABLET | Refills: 0 | Status: SHIPPED | OUTPATIENT
Start: 2024-11-18

## 2024-11-18 NOTE — TELEPHONE ENCOUNTER
Patient wants a refill on two prescriptions:  Clopidogrel 75 mg & Atorvastatin 40 mg.  Pharmacy is [facility] on [facility] in [place].    Patient's number:  [phone]

## 2024-12-09 ENCOUNTER — TELEPHONE (OUTPATIENT)
Dept: PRIMARY CARE | Facility: CLINIC | Age: 74
End: 2024-12-09
Payer: COMMERCIAL

## 2024-12-09 NOTE — TELEPHONE ENCOUNTER
Pt made an appt with a nephrologist and they want labs done for this appt but want Cayla to order it . Pt has no idea what labs they want . I did tell him that I did not think you would order, not knowing what they want .

## 2024-12-10 DIAGNOSIS — E03.9 ACQUIRED HYPOTHYROIDISM: ICD-10-CM

## 2024-12-10 DIAGNOSIS — E55.9 VITAMIN D DEFICIENCY, UNSPECIFIED: ICD-10-CM

## 2024-12-10 RX ORDER — LEVOTHYROXINE SODIUM 88 UG/1
88 TABLET ORAL DAILY
Qty: 90 TABLET | Refills: 0 | Status: SHIPPED | OUTPATIENT
Start: 2024-12-10 | End: 2025-03-10

## 2024-12-10 RX ORDER — ERGOCALCIFEROL 1.25 MG/1
1 CAPSULE ORAL
Qty: 12 CAPSULE | Refills: 1 | Status: SHIPPED | OUTPATIENT
Start: 2024-12-10

## 2024-12-23 ENCOUNTER — TELEPHONE (OUTPATIENT)
Dept: PRIMARY CARE | Facility: CLINIC | Age: 74
End: 2024-12-23
Payer: COMMERCIAL

## 2024-12-23 DIAGNOSIS — M06.9 RHEUMATOID ARTHRITIS INVOLVING MULTIPLE SITES, UNSPECIFIED WHETHER RHEUMATOID FACTOR PRESENT (MULTI): ICD-10-CM

## 2024-12-23 RX ORDER — PREDNISONE 5 MG/1
5 TABLET ORAL DAILY
Qty: 90 TABLET | Refills: 0 | Status: SHIPPED | OUTPATIENT
Start: 2024-12-23

## 2025-01-07 DIAGNOSIS — N18.32 TYPE 2 DIABETES MELLITUS WITH STAGE 3B CHRONIC KIDNEY DISEASE, WITHOUT LONG-TERM CURRENT USE OF INSULIN (MULTI): ICD-10-CM

## 2025-01-07 DIAGNOSIS — E11.22 TYPE 2 DIABETES MELLITUS WITH STAGE 3B CHRONIC KIDNEY DISEASE, WITHOUT LONG-TERM CURRENT USE OF INSULIN (MULTI): ICD-10-CM

## 2025-01-07 RX ORDER — LISINOPRIL 5 MG/1
5 TABLET ORAL DAILY
Qty: 90 TABLET | Refills: 0 | Status: SHIPPED | OUTPATIENT
Start: 2025-01-07

## 2025-01-10 ENCOUNTER — OFFICE VISIT (OUTPATIENT)
Dept: RHEUMATOLOGY | Facility: CLINIC | Age: 75
End: 2025-01-10
Payer: MEDICARE

## 2025-01-10 VITALS — WEIGHT: 167 LBS | BODY MASS INDEX: 25.39 KG/M2 | SYSTOLIC BLOOD PRESSURE: 104 MMHG | DIASTOLIC BLOOD PRESSURE: 56 MMHG

## 2025-01-10 DIAGNOSIS — M06.9 RHEUMATOID ARTHRITIS INVOLVING MULTIPLE SITES, UNSPECIFIED WHETHER RHEUMATOID FACTOR PRESENT (MULTI): Primary | ICD-10-CM

## 2025-01-10 DIAGNOSIS — M81.0 OSTEOPOROSIS, SENILE: ICD-10-CM

## 2025-01-10 DIAGNOSIS — M15.9 OSTEOARTHRITIS OF MULTIPLE JOINTS, UNSPECIFIED OSTEOARTHRITIS TYPE: ICD-10-CM

## 2025-01-10 PROCEDURE — 99214 OFFICE O/P EST MOD 30 MIN: CPT | Performed by: INTERNAL MEDICINE

## 2025-01-10 PROCEDURE — 3078F DIAST BP <80 MM HG: CPT | Performed by: INTERNAL MEDICINE

## 2025-01-10 PROCEDURE — 1159F MED LIST DOCD IN RCRD: CPT | Performed by: INTERNAL MEDICINE

## 2025-01-10 PROCEDURE — 3074F SYST BP LT 130 MM HG: CPT | Performed by: INTERNAL MEDICINE

## 2025-01-10 NOTE — PROGRESS NOTES
"Recheck  OA  /  RA  /  OP  ( refused repeat DEXA )  doing well with prednisone 5 mg daily    HPUI \"doing good\"  Hands stiff in AM intermittently up to 2 hrs and pain when he tries to move it.  No other pain.  No swelling.  No CP, resp, or GI.      PE  NAD  RRR II/VI syst murmur  CTA  1+ BLE edema  Min NT synovitis R 2nd/3rd MCPs and 3rd finger PIP and L 3rd finger MCP and PIP    A/P - OA and RA, on low dose pred and refusing DMARDS  OP - refusing repeat DEXA after drug holiday  Reviewed prev labs - CRF, anemia.  Is to see nephrol  Follow up 6 mo or sooner PRN  "

## 2025-01-14 DIAGNOSIS — N18.32 TYPE 2 DIABETES MELLITUS WITH STAGE 3B CHRONIC KIDNEY DISEASE, WITHOUT LONG-TERM CURRENT USE OF INSULIN (MULTI): ICD-10-CM

## 2025-01-14 DIAGNOSIS — E11.22 TYPE 2 DIABETES MELLITUS WITH STAGE 3B CHRONIC KIDNEY DISEASE, WITHOUT LONG-TERM CURRENT USE OF INSULIN (MULTI): ICD-10-CM

## 2025-01-14 RX ORDER — LISINOPRIL 5 MG/1
5 TABLET ORAL DAILY
Qty: 90 TABLET | Refills: 0 | OUTPATIENT
Start: 2025-01-14

## 2025-02-13 DIAGNOSIS — I73.9 PERIPHERAL VASCULAR DISEASE, UNSPECIFIED (CMS-HCC): ICD-10-CM

## 2025-02-13 RX ORDER — ATORVASTATIN CALCIUM 40 MG/1
40 TABLET, FILM COATED ORAL DAILY
Qty: 90 TABLET | Refills: 0 | Status: SHIPPED | OUTPATIENT
Start: 2025-02-13

## 2025-02-17 ENCOUNTER — TELEPHONE (OUTPATIENT)
Dept: PRIMARY CARE | Facility: CLINIC | Age: 75
End: 2025-02-17
Payer: COMMERCIAL

## 2025-02-17 DIAGNOSIS — I65.29 CAROTID ARTERY CALCIFICATION, UNSPECIFIED LATERALITY: ICD-10-CM

## 2025-02-17 DIAGNOSIS — E03.9 ACQUIRED HYPOTHYROIDISM: ICD-10-CM

## 2025-02-17 DIAGNOSIS — I73.9 PERIPHERAL VASCULAR DISEASE, UNSPECIFIED (CMS-HCC): ICD-10-CM

## 2025-02-17 DIAGNOSIS — I73.9 PERIPHERAL VASCULAR DISEASE (CMS-HCC): ICD-10-CM

## 2025-02-17 RX ORDER — CLOPIDOGREL BISULFATE 75 MG/1
75 TABLET ORAL DAILY
Qty: 90 TABLET | Refills: 0 | Status: SHIPPED | OUTPATIENT
Start: 2025-02-17

## 2025-02-17 RX ORDER — LEVOTHYROXINE SODIUM 88 UG/1
88 TABLET ORAL DAILY
Qty: 90 TABLET | Refills: 0 | Status: SHIPPED | OUTPATIENT
Start: 2025-02-17 | End: 2025-05-18

## 2025-02-17 RX ORDER — ATORVASTATIN CALCIUM 40 MG/1
40 TABLET, FILM COATED ORAL DAILY
Qty: 90 TABLET | Refills: 0 | Status: CANCELLED | OUTPATIENT
Start: 2025-02-17

## 2025-02-17 NOTE — TELEPHONE ENCOUNTER
Pharmacy called on behalf of patient for refills on Clopidogrel 75 mg, Levothyroxine 88 mcg, and Atorvastatin 40 mg going to [facility] in [place].    Last Appt: 10/18/2024  Next Appt: 04/18/2025    Contact: [phone]

## 2025-02-18 ENCOUNTER — TELEPHONE (OUTPATIENT)
Dept: PRIMARY CARE | Facility: CLINIC | Age: 75
End: 2025-02-18
Payer: COMMERCIAL

## 2025-02-18 DIAGNOSIS — I73.9 PERIPHERAL VASCULAR DISEASE, UNSPECIFIED (CMS-HCC): ICD-10-CM

## 2025-02-18 RX ORDER — ATORVASTATIN CALCIUM 40 MG/1
40 TABLET, FILM COATED ORAL DAILY
Qty: 90 TABLET | Refills: 0 | Status: SHIPPED | OUTPATIENT
Start: 2025-02-18

## 2025-02-18 NOTE — TELEPHONE ENCOUNTER
Rx Refill Request Telephone Encounter    Name:  Jose Manuel Andujar  :  [id number]  Medication Name:   Atorvastatin             Specific Pharmacy location:  [facility], was sent to [facility] pharmacy  Date of last appointment:    Date of next appointment:    Best number to reach patient:

## 2025-02-20 DIAGNOSIS — E03.9 ACQUIRED HYPOTHYROIDISM: ICD-10-CM

## 2025-02-21 RX ORDER — LEVOTHYROXINE SODIUM 88 UG/1
TABLET ORAL
Qty: 90 TABLET | Refills: 0 | OUTPATIENT
Start: 2025-02-21

## 2025-03-18 ENCOUNTER — TELEPHONE (OUTPATIENT)
Dept: PRIMARY CARE | Facility: CLINIC | Age: 75
End: 2025-03-18
Payer: MEDICARE

## 2025-03-18 DIAGNOSIS — G40.909 EPILEPSY, UNSPECIFIED, NOT INTRACTABLE, WITHOUT STATUS EPILEPTICUS: ICD-10-CM

## 2025-03-18 DIAGNOSIS — G62.9 POLYNEUROPATHY, UNSPECIFIED: ICD-10-CM

## 2025-03-18 RX ORDER — LEVETIRACETAM 250 MG/1
250 TABLET ORAL 2 TIMES DAILY
Qty: 180 TABLET | Refills: 3 | OUTPATIENT
Start: 2025-03-18

## 2025-03-18 RX ORDER — GABAPENTIN 600 MG/1
600 TABLET ORAL DAILY
Qty: 90 TABLET | Refills: 3 | OUTPATIENT
Start: 2025-03-18

## 2025-03-18 NOTE — TELEPHONE ENCOUNTER
Patient called RX line for refills of:    Levetiracetam 250 mg qty of 180  Gabapentin 600 mg qty of 90    To go to [facility] in [place].  Last seen on 10-18-24 and upcoming appointment on 4-18-25

## 2025-03-19 ENCOUNTER — TELEPHONE (OUTPATIENT)
Dept: PRIMARY CARE | Facility: CLINIC | Age: 75
End: 2025-03-19
Payer: MEDICARE

## 2025-03-19 DIAGNOSIS — G40.909 EPILEPSY, UNSPECIFIED, NOT INTRACTABLE, WITHOUT STATUS EPILEPTICUS: ICD-10-CM

## 2025-03-19 RX ORDER — LEVETIRACETAM 250 MG/1
250 TABLET ORAL 2 TIMES DAILY
Qty: 180 TABLET | Refills: 3 | Status: SHIPPED | OUTPATIENT
Start: 2025-03-19

## 2025-03-19 NOTE — TELEPHONE ENCOUNTER
Spoke with the patient, he said he does have a new provider he is going to try calling to set up an appointment, he also said if he cannot get in for awhile he will do without his medication. He did say he is not sure the office of the old provider is still open but he is going to try and call them. I told him if he needs a referral to call us back.

## 2025-03-19 NOTE — TELEPHONE ENCOUNTER
Patient was a previous patient of Dr Pierson and was last seen on 3/8/24.  Is scheduled with Dr Elmore on 4/24/25.

## 2025-03-21 ENCOUNTER — OFFICE VISIT (OUTPATIENT)
Dept: URGENT CARE | Age: 75
End: 2025-03-21
Payer: MEDICARE

## 2025-03-21 VITALS
WEIGHT: 160 LBS | OXYGEN SATURATION: 99 % | DIASTOLIC BLOOD PRESSURE: 74 MMHG | BODY MASS INDEX: 22.9 KG/M2 | TEMPERATURE: 97.6 F | SYSTOLIC BLOOD PRESSURE: 170 MMHG | RESPIRATION RATE: 16 BRPM | HEART RATE: 66 BPM | HEIGHT: 70 IN

## 2025-03-21 DIAGNOSIS — E11.622 TYPE 2 DIABETES MELLITUS WITH OTHER SKIN ULCER, WITHOUT LONG-TERM CURRENT USE OF INSULIN: ICD-10-CM

## 2025-03-21 DIAGNOSIS — S81.002A OPEN WOUND OF LEFT KNEE, INITIAL ENCOUNTER: Primary | ICD-10-CM

## 2025-03-21 DIAGNOSIS — I73.9 PERIPHERAL VASCULAR DISEASE (CMS-HCC): ICD-10-CM

## 2025-03-21 PROCEDURE — 3077F SYST BP >= 140 MM HG: CPT

## 2025-03-21 PROCEDURE — 1159F MED LIST DOCD IN RCRD: CPT

## 2025-03-21 PROCEDURE — 3078F DIAST BP <80 MM HG: CPT

## 2025-03-21 PROCEDURE — 99214 OFFICE O/P EST MOD 30 MIN: CPT

## 2025-03-21 PROCEDURE — 1125F AMNT PAIN NOTED PAIN PRSNT: CPT

## 2025-03-21 RX ORDER — DOXYCYCLINE 100 MG/1
100 CAPSULE ORAL 2 TIMES DAILY
Qty: 14 CAPSULE | Refills: 0 | Status: SHIPPED | OUTPATIENT
Start: 2025-03-21 | End: 2025-03-26 | Stop reason: ALTCHOICE

## 2025-03-21 ASSESSMENT — PAIN SCALES - GENERAL: PAINLEVEL_OUTOF10: 3

## 2025-03-21 NOTE — PATIENT INSTRUCTIONS
Wash twice daily with soap and water, apply bacitracin twice daily and cover with band aid, keeping wound moist will help with healing.  Take antibiotics as prescribed.  Follow up with primary care provider for wound check in 3 days.  Call for wound care referral.  Go to emergency department for inability to move the knee, fevers, increasing redness, swelling pain.  Recheck blood pressure with primary care provider.

## 2025-03-21 NOTE — PROGRESS NOTES
Subjective   Patient ID: Jose Manuel Andujar is a 75 y.o. male. They present today with a chief complaint of Other (Bump on left knee, red, swollen x 1 week, pain since last night).    History of Present Illness  HPI    Past Medical History  Allergies as of 03/21/2025    (No Known Allergies)       (Not in a hospital admission)       Past Medical History:   Diagnosis Date    Arthritis     Cancer (Multi)     Cataract     Disease of thyroid gland     Encounter for screening for malignant neoplasm of prostate     Screening PSA (prostate specific antigen)    Glaucoma     Hyperlipidemia, unspecified 12/08/2013    Hyperlipidemia    Hypertension     Personal history of colonic polyps     History of colonic polyps    Stroke (Multi)        Past Surgical History:   Procedure Laterality Date    CHOLECYSTECTOMY  02/10/2014    Cholecystectomy    CT GUIDED IMAGING FOR NEEDLE PLACEMENT  3/28/2018    CT GUIDED IMAGING FOR NEEDLE PLACEMENT [facility]    ESOPHAGOGASTRODUODENOSCOPY  02/10/2014    Diagnostic Esophagogastroduodenoscopy    HERNIA REPAIR      JOINT REPLACEMENT      KNEE ARTHROSCOPY W/ DEBRIDEMENT  02/10/2014    Arthroscopy Knee    MR HEAD ANGIO WO IV CONTRAST  5/8/2018    MR HEAD ANGIO WO IV CONTRAST [facility]    OTHER SURGICAL HISTORY  02/10/2014    Type Of Stress Test    PROSTATE SURGERY      REFRACTIVE SURGERY  02/10/2014    Corneal LASIK    ROTATOR CUFF REPAIR  02/10/2014    Rotator Cuff Repair    TOTAL KNEE ARTHROPLASTY  02/10/2014    Total Knee Arthroplasty        reports that he has been smoking cigarettes. He has been exposed to tobacco smoke. He has never used smokeless tobacco. He reports that he does not drink alcohol and does not use drugs.    Review of Systems  Review of Systems                               Objective    Vitals:    03/21/25 0856   BP: 170/74   BP Location: Left arm   Patient Position: Sitting   BP Cuff Size: Adult   Pulse: 66   Resp: 16   Temp: 36.4 °C (97.6 °F)   TempSrc: Oral  "  SpO2: 99%   Weight: 72.6 kg (160 lb)   Height: 1.778 m (5' 10\")     No LMP for male patient.    Physical Exam    Procedures    Point of Care Test & Imaging Results from this visit  No results found for this visit on 03/21/25.   No results found.    Diagnostic study results (if any) were reviewed by Kortney Espinoza PA-C.    Assessment/Plan   Allergies, medications, history, and pertinent labs/EKGs/Imaging reviewed by Kortney Espinoza PA-C.     Medical Decision Making  ***    Orders and Diagnoses  Diagnoses and all orders for this visit:  Open wound of left knee, initial encounter  -     doxycycline (Vibramycin) 100 mg capsule; Take 1 capsule (100 mg) by mouth 2 times a day for 7 days. Take with at least 8 ounces (large glass) of water, do not lie down for 30 minutes after      Medical Admin Record      Patient disposition: { Disposition:[id number]}    Electronically signed by Kortney Espinoza PA-C  9:39 AM      " from this visit  No results found for this visit on 03/21/25.   No results found.    Diagnostic study results (if any) were reviewed by Kortney Espinoza PA-C.    Assessment/Plan   Allergies, medications, history, and pertinent labs/EKGs/Imaging reviewed by Kortney Espinoza PA-C.     Medical Decision Making  75-year-old male presents with complaint of left knee wound.  He was gardening a couple of days prior to onset of wound.  He noticed a sharp pain while gardening, thinks he may have been bitten by a bug.  No systemic symptoms.  Chart review with history of DM, patient denies.  He does have PVD.  No features of septic arthritis, cellulitis, abscess, NV compromise, or other emergency.  Will treat with doxycyline with consideration for soft tissue infection given mild surrounding erythema.  Discussed wound care, signs and symptoms of worsening infection, indications for follow-up or presentation to emergency department.  Provided wound care consult given history of PVD and documented diabetes mellitus.  Discussed these increased risk of poor wound healing.  Patient is present in clinic with son who will assist with treatment and monitoring of symptoms as well.  Encouraged follow-up with primary care provider.  Discussed expected course, indications for return or for presentation to emergency department.  Discharged good condition agreeable to plan as discussed.      Orders and Diagnoses  Diagnoses and all orders for this visit:  Open wound of left knee, initial encounter  -     doxycycline (Vibramycin) 100 mg capsule; Take 1 capsule (100 mg) by mouth 2 times a day for 7 days. Take with at least 8 ounces (large glass) of water, do not lie down for 30 minutes after  -     Referral to Wound Clinic; Future  Peripheral vascular disease (CMS-HCC)  Type 2 diabetes mellitus with other skin ulcer, without long-term current use of insulin      Medical Admin Record      Patient disposition: Home    Electronically signed by  Kortney Espinoza PA-C  3:01 PM

## 2025-03-26 ENCOUNTER — OFFICE VISIT (OUTPATIENT)
Dept: PRIMARY CARE | Facility: CLINIC | Age: 75
End: 2025-03-26
Payer: MEDICARE

## 2025-03-26 VITALS
DIASTOLIC BLOOD PRESSURE: 90 MMHG | HEART RATE: 71 BPM | WEIGHT: 165.4 LBS | BODY MASS INDEX: 23.73 KG/M2 | OXYGEN SATURATION: 97 % | SYSTOLIC BLOOD PRESSURE: 140 MMHG | TEMPERATURE: 97.2 F

## 2025-03-26 DIAGNOSIS — E11.22 TYPE 2 DIABETES MELLITUS WITH STAGE 3B CHRONIC KIDNEY DISEASE, WITHOUT LONG-TERM CURRENT USE OF INSULIN (MULTI): ICD-10-CM

## 2025-03-26 DIAGNOSIS — N18.32 TYPE 2 DIABETES MELLITUS WITH STAGE 3B CHRONIC KIDNEY DISEASE, WITHOUT LONG-TERM CURRENT USE OF INSULIN (MULTI): ICD-10-CM

## 2025-03-26 DIAGNOSIS — L97.821: Primary | ICD-10-CM

## 2025-03-26 PROCEDURE — 1125F AMNT PAIN NOTED PAIN PRSNT: CPT

## 2025-03-26 PROCEDURE — 3077F SYST BP >= 140 MM HG: CPT

## 2025-03-26 PROCEDURE — 1123F ACP DISCUSS/DSCN MKR DOCD: CPT

## 2025-03-26 PROCEDURE — 3080F DIAST BP >= 90 MM HG: CPT

## 2025-03-26 PROCEDURE — 1158F ADVNC CARE PLAN TLK DOCD: CPT

## 2025-03-26 PROCEDURE — 99213 OFFICE O/P EST LOW 20 MIN: CPT

## 2025-03-26 PROCEDURE — 1159F MED LIST DOCD IN RCRD: CPT

## 2025-03-26 RX ORDER — SULFAMETHOXAZOLE AND TRIMETHOPRIM 800; 160 MG/1; MG/1
1 TABLET ORAL 2 TIMES DAILY
Qty: 14 TABLET | Refills: 0 | Status: SHIPPED | OUTPATIENT
Start: 2025-03-26 | End: 2025-04-02

## 2025-03-26 ASSESSMENT — PAIN SCALES - GENERAL: PAINLEVEL_OUTOF10: 2

## 2025-03-26 NOTE — PROGRESS NOTES
Subjective   Patient ID: Jose Manuel Andujar is a 75 y.o. male who presents for Follow-up (UC FU- pt has wound on left knee that started about 10 days ago. 5 days ago pt went to Urgent care where he was prescribed doxycycline. Pt denies itching but notes it's tender and sore at the sight. Wound is red but not currently open or draining. Pt states wound hasn't improved since starting the antibiotic. UC noted DM which could contribute to poor wound healing but pt denies DM.).    HPI   Jose Manuel is seen for c/o wound left knee for 10 days. Is unsure of how he injured it, possible bug bite. Reports redness, tenderness. Swelling has improved some but has not noticed much improvement in redness, tenderness. Was started on Doxycycline 5 days ago by UC. Has been keeping clean and applying Bacitracin.  Denies fever, chills, drainage, numbness, tingling, weakness of LLE.     Review of Systems  All other systems have been reviewed and are negative except as noted in the HPI.     Objective   /90 (BP Location: Left arm, Patient Position: Sitting)   Pulse 71   Temp 36.2 °C (97.2 °F) (Temporal)   Wt 75 kg (165 lb 6.4 oz)   SpO2 97%   BMI 23.73 kg/m²     Physical Exam  Vitals and nursing note reviewed.   Constitutional:       General: He is not in acute distress.  Eyes:      Extraocular Movements: Extraocular movements intact.      Conjunctiva/sclera: Conjunctivae normal.   Cardiovascular:      Rate and Rhythm: Normal rate.   Pulmonary:      Effort: Pulmonary effort is normal.   Musculoskeletal:      Cervical back: Neck supple.      Left lower leg: No edema.   Skin:     General: Skin is warm.      Capillary Refill: Capillary refill takes less than 2 seconds.      Findings: Wound present.          Neurological:      General: No focal deficit present.      Mental Status: He is alert.   Psychiatric:         Mood and Affect: Mood normal.     Assessment/Plan   Assessment & Plan  Skin ulcer of left knee, limited to breakdown of  skin  Acute, needs better control.   Discontinue Doxycycline. Start Bactrim, CrCl 37. Risks and benefits of medication discussed and prescribed.   Cleanse area twice daily with antibacterial soap, keep covered. May apply cool compresses for swelling.   Referral to wound clinic.   Follow up in 1 week if unable to get in with specialist before then, or sooner for worsening.     Orders:  •  Referral to Wound Clinic; Future  •  sulfamethoxazole-trimethoprim (Bactrim DS) 800-160 mg tablet; Take 1 tablet by mouth 2 times a day for 7 days.    Type 2 diabetes mellitus with stage 3b chronic kidney disease, without long-term current use of insulin (Multi)    Orders:  •  Referral to Wound Clinic; Future

## 2025-03-28 ENCOUNTER — PHARMACY VISIT (OUTPATIENT)
Dept: PHARMACY | Facility: CLINIC | Age: 75
End: 2025-03-28
Payer: COMMERCIAL

## 2025-03-28 ENCOUNTER — OFFICE VISIT (OUTPATIENT)
Dept: WOUND CARE | Facility: HOSPITAL | Age: 75
End: 2025-03-28
Payer: MEDICARE

## 2025-03-28 DIAGNOSIS — N18.32 TYPE 2 DIABETES MELLITUS WITH STAGE 3B CHRONIC KIDNEY DISEASE, WITHOUT LONG-TERM CURRENT USE OF INSULIN (MULTI): ICD-10-CM

## 2025-03-28 DIAGNOSIS — E11.22 TYPE 2 DIABETES MELLITUS WITH STAGE 3B CHRONIC KIDNEY DISEASE, WITHOUT LONG-TERM CURRENT USE OF INSULIN (MULTI): ICD-10-CM

## 2025-03-28 DIAGNOSIS — L97.821: ICD-10-CM

## 2025-03-28 PROCEDURE — RXMED WILLOW AMBULATORY MEDICATION CHARGE

## 2025-03-28 PROCEDURE — 99213 OFFICE O/P EST LOW 20 MIN: CPT | Mod: 25

## 2025-03-28 PROCEDURE — 11042 DBRDMT SUBQ TIS 1ST 20SQCM/<: CPT

## 2025-03-28 RX ORDER — COLLAGENASE SANTYL 250 [ARB'U]/G
OINTMENT TOPICAL
Qty: 30 G | Refills: 1 | OUTPATIENT
Start: 2025-03-28

## 2025-04-02 ENCOUNTER — APPOINTMENT (OUTPATIENT)
Dept: PRIMARY CARE | Facility: CLINIC | Age: 75
End: 2025-04-02
Payer: MEDICARE

## 2025-04-04 ENCOUNTER — OFFICE VISIT (OUTPATIENT)
Dept: WOUND CARE | Facility: HOSPITAL | Age: 75
End: 2025-04-04
Payer: MEDICARE

## 2025-04-04 PROCEDURE — 11042 DBRDMT SUBQ TIS 1ST 20SQCM/<: CPT

## 2025-04-07 DIAGNOSIS — M06.9 RHEUMATOID ARTHRITIS INVOLVING MULTIPLE SITES, UNSPECIFIED WHETHER RHEUMATOID FACTOR PRESENT (MULTI): ICD-10-CM

## 2025-04-07 RX ORDER — PREDNISONE 5 MG/1
5 TABLET ORAL DAILY
Qty: 90 TABLET | Refills: 0 | Status: SHIPPED | OUTPATIENT
Start: 2025-04-07

## 2025-04-09 LAB
25(OH)D3+25(OH)D2 SERPL-MCNC: 87 NG/ML (ref 30–100)
ALBUMIN SERPL-MCNC: 3.9 G/DL (ref 3.6–5.1)
ALBUMIN/CREAT UR: 109 MG/G CREAT
ALP SERPL-CCNC: 98 U/L (ref 35–144)
ALT SERPL-CCNC: 37 U/L (ref 9–46)
ANION GAP SERPL CALCULATED.4IONS-SCNC: 9 MMOL/L (CALC) (ref 7–17)
APPEARANCE UR: CLEAR
AST SERPL-CCNC: 26 U/L (ref 10–35)
BACTERIA #/AREA URNS HPF: ABNORMAL /HPF
BASOPHILS # BLD AUTO: 68 CELLS/UL (ref 0–200)
BASOPHILS NFR BLD AUTO: 0.6 %
BILIRUB SERPL-MCNC: 0.5 MG/DL (ref 0.2–1.2)
BILIRUB UR QL STRIP: NEGATIVE
BUN SERPL-MCNC: 37 MG/DL (ref 7–25)
CALCIUM SERPL-MCNC: 9.3 MG/DL (ref 8.6–10.3)
CHLORIDE SERPL-SCNC: 108 MMOL/L (ref 98–110)
CHOLEST SERPL-MCNC: 152 MG/DL
CHOLEST/HDLC SERPL: 2.5 (CALC)
CO2 SERPL-SCNC: 22 MMOL/L (ref 20–32)
COLOR UR: YELLOW
CREAT SERPL-MCNC: 1.86 MG/DL (ref 0.7–1.28)
CREAT UR-MCNC: 113 MG/DL (ref 20–320)
EGFRCR SERPLBLD CKD-EPI 2021: 37 ML/MIN/1.73M2
EOSINOPHIL # BLD AUTO: 68 CELLS/UL (ref 15–500)
EOSINOPHIL NFR BLD AUTO: 0.6 %
ERYTHROCYTE [DISTWIDTH] IN BLOOD BY AUTOMATED COUNT: 12.3 % (ref 11–15)
EST. AVERAGE GLUCOSE BLD GHB EST-MCNC: 197 MG/DL
EST. AVERAGE GLUCOSE BLD GHB EST-SCNC: 10.9 MMOL/L
GLUCOSE SERPL-MCNC: 201 MG/DL (ref 65–99)
GLUCOSE UR QL STRIP: NEGATIVE
HBA1C MFR BLD: 8.5 % OF TOTAL HGB
HCT VFR BLD AUTO: 33.8 % (ref 38.5–50)
HDLC SERPL-MCNC: 60 MG/DL
HGB BLD-MCNC: 10.9 G/DL (ref 13.2–17.1)
HGB UR QL STRIP: NEGATIVE
HYALINE CASTS #/AREA URNS LPF: ABNORMAL /LPF
IRON SATN MFR SERPL: 16 % (CALC) (ref 20–48)
IRON SERPL-MCNC: 57 MCG/DL (ref 50–180)
KETONES UR QL STRIP: NEGATIVE
LDLC SERPL CALC-MCNC: 70 MG/DL (CALC)
LEUKOCYTE ESTERASE UR QL STRIP: NEGATIVE
LYMPHOCYTES # BLD AUTO: 870 CELLS/UL (ref 850–3900)
LYMPHOCYTES NFR BLD AUTO: 7.7 %
MCH RBC QN AUTO: 31.5 PG (ref 27–33)
MCHC RBC AUTO-ENTMCNC: 32.2 G/DL (ref 32–36)
MCV RBC AUTO: 97.7 FL (ref 80–100)
MICROALBUMIN UR-MCNC: 12.3 MG/DL
MONOCYTES # BLD AUTO: 441 CELLS/UL (ref 200–950)
MONOCYTES NFR BLD AUTO: 3.9 %
NEUTROPHILS # BLD AUTO: 9854 CELLS/UL (ref 1500–7800)
NEUTROPHILS NFR BLD AUTO: 87.2 %
NITRITE UR QL STRIP: NEGATIVE
NONHDLC SERPL-MCNC: 92 MG/DL (CALC)
PH UR STRIP: 5.5 [PH] (ref 5–8)
PLATELET # BLD AUTO: 436 THOUSAND/UL (ref 140–400)
PMV BLD REES-ECKER: 9 FL (ref 7.5–12.5)
POTASSIUM SERPL-SCNC: 5.6 MMOL/L (ref 3.5–5.3)
PROT SERPL-MCNC: 6.3 G/DL (ref 6.1–8.1)
PROT UR QL STRIP: ABNORMAL
RBC # BLD AUTO: 3.46 MILLION/UL (ref 4.2–5.8)
RBC #/AREA URNS HPF: ABNORMAL /HPF
SERVICE CMNT-IMP: ABNORMAL
SODIUM SERPL-SCNC: 139 MMOL/L (ref 135–146)
SP GR UR STRIP: 1.02 (ref 1–1.03)
SQUAMOUS #/AREA URNS HPF: ABNORMAL /HPF
TIBC SERPL-MCNC: 356 MCG/DL (CALC) (ref 250–425)
TRIGL SERPL-MCNC: 141 MG/DL
TSH SERPL-ACNC: 3.06 MIU/L (ref 0.4–4.5)
WBC # BLD AUTO: 11.3 THOUSAND/UL (ref 3.8–10.8)
WBC #/AREA URNS HPF: ABNORMAL /HPF

## 2025-04-11 ENCOUNTER — OFFICE VISIT (OUTPATIENT)
Dept: WOUND CARE | Facility: HOSPITAL | Age: 75
End: 2025-04-11
Payer: MEDICARE

## 2025-04-11 PROCEDURE — 11042 DBRDMT SUBQ TIS 1ST 20SQCM/<: CPT

## 2025-04-17 DIAGNOSIS — E11.8 TYPE 2 DIABETES MELLITUS WITH UNSPECIFIED COMPLICATIONS: ICD-10-CM

## 2025-04-17 DIAGNOSIS — I12.9 TYPE 2 DM WITH CKD STAGE 3 AND HYPERTENSION (MULTI): ICD-10-CM

## 2025-04-17 DIAGNOSIS — E11.22 TYPE 2 DM WITH CKD STAGE 3 AND HYPERTENSION (MULTI): ICD-10-CM

## 2025-04-17 DIAGNOSIS — N18.30 TYPE 2 DM WITH CKD STAGE 3 AND HYPERTENSION (MULTI): ICD-10-CM

## 2025-04-18 ENCOUNTER — OFFICE VISIT (OUTPATIENT)
Dept: WOUND CARE | Facility: HOSPITAL | Age: 75
End: 2025-04-18
Payer: MEDICARE

## 2025-04-18 ENCOUNTER — APPOINTMENT (OUTPATIENT)
Dept: PRIMARY CARE | Facility: CLINIC | Age: 75
End: 2025-04-18
Payer: COMMERCIAL

## 2025-04-18 ENCOUNTER — TELEPHONE (OUTPATIENT)
Dept: PRIMARY CARE | Facility: CLINIC | Age: 75
End: 2025-04-18

## 2025-04-18 VITALS
OXYGEN SATURATION: 90 % | SYSTOLIC BLOOD PRESSURE: 130 MMHG | DIASTOLIC BLOOD PRESSURE: 80 MMHG | WEIGHT: 162.2 LBS | BODY MASS INDEX: 23.27 KG/M2 | TEMPERATURE: 97.4 F | HEART RATE: 77 BPM

## 2025-04-18 DIAGNOSIS — Z00.00 WELL ADULT EXAM: Primary | ICD-10-CM

## 2025-04-18 DIAGNOSIS — N18.32 TYPE 2 DIABETES MELLITUS WITH STAGE 3B CHRONIC KIDNEY DISEASE, WITHOUT LONG-TERM CURRENT USE OF INSULIN (MULTI): ICD-10-CM

## 2025-04-18 DIAGNOSIS — I10 PRIMARY HYPERTENSION: ICD-10-CM

## 2025-04-18 DIAGNOSIS — D72.829 LEUKOCYTOSIS, UNSPECIFIED TYPE: ICD-10-CM

## 2025-04-18 DIAGNOSIS — E03.9 ACQUIRED HYPOTHYROIDISM: ICD-10-CM

## 2025-04-18 DIAGNOSIS — E11.22 TYPE 2 DIABETES MELLITUS WITH STAGE 3B CHRONIC KIDNEY DISEASE, WITHOUT LONG-TERM CURRENT USE OF INSULIN (MULTI): ICD-10-CM

## 2025-04-18 DIAGNOSIS — E78.5 HYPERLIPIDEMIA, UNSPECIFIED HYPERLIPIDEMIA TYPE: ICD-10-CM

## 2025-04-18 DIAGNOSIS — I73.9 PERIPHERAL VASCULAR DISEASE, UNSPECIFIED (CMS-HCC): ICD-10-CM

## 2025-04-18 PROCEDURE — 99397 PER PM REEVAL EST PAT 65+ YR: CPT

## 2025-04-18 PROCEDURE — 1123F ACP DISCUSS/DSCN MKR DOCD: CPT

## 2025-04-18 PROCEDURE — 1158F ADVNC CARE PLAN TLK DOCD: CPT

## 2025-04-18 PROCEDURE — 99214 OFFICE O/P EST MOD 30 MIN: CPT

## 2025-04-18 PROCEDURE — 1160F RVW MEDS BY RX/DR IN RCRD: CPT

## 2025-04-18 PROCEDURE — 1170F FXNL STATUS ASSESSED: CPT

## 2025-04-18 PROCEDURE — 3075F SYST BP GE 130 - 139MM HG: CPT

## 2025-04-18 PROCEDURE — G0439 PPPS, SUBSEQ VISIT: HCPCS

## 2025-04-18 PROCEDURE — 3079F DIAST BP 80-89 MM HG: CPT

## 2025-04-18 PROCEDURE — 1159F MED LIST DOCD IN RCRD: CPT

## 2025-04-18 PROCEDURE — 11042 DBRDMT SUBQ TIS 1ST 20SQCM/<: CPT

## 2025-04-18 PROCEDURE — 1126F AMNT PAIN NOTED NONE PRSNT: CPT

## 2025-04-18 RX ORDER — LINAGLIPTIN 5 MG/1
5 TABLET, FILM COATED ORAL DAILY
Qty: 90 TABLET | Refills: 0 | Status: SHIPPED | OUTPATIENT
Start: 2025-04-18 | End: 2025-07-17

## 2025-04-18 RX ORDER — LISINOPRIL 5 MG/1
5 TABLET ORAL DAILY
Qty: 90 TABLET | Refills: 1 | Status: SHIPPED | OUTPATIENT
Start: 2025-04-18 | End: 2025-10-15

## 2025-04-18 ASSESSMENT — ENCOUNTER SYMPTOMS
UNEXPECTED WEIGHT CHANGE: 0
OCCASIONAL FEELINGS OF UNSTEADINESS: 0
SHORTNESS OF BREATH: 0
VOMITING: 0
NERVOUS/ANXIOUS: 0
DYSPHORIC MOOD: 0
BLOOD IN STOOL: 0
HEMATURIA: 0
FEVER: 0
LOSS OF SENSATION IN FEET: 0
MYALGIAS: 0
CHILLS: 0
COUGH: 0
DEPRESSION: 0
NAUSEA: 0
ARTHRALGIAS: 0
SORE THROAT: 0
ABDOMINAL PAIN: 0
NUMBNESS: 0
TROUBLE SWALLOWING: 0
WEAKNESS: 0
DIFFICULTY URINATING: 0

## 2025-04-18 ASSESSMENT — PAIN SCALES - GENERAL: PAINLEVEL_OUTOF10: 0-NO PAIN

## 2025-04-18 ASSESSMENT — ACTIVITIES OF DAILY LIVING (ADL)
BATHING: INDEPENDENT
DOING_HOUSEWORK: INDEPENDENT
DRESSING: INDEPENDENT
MANAGING_FINANCES: INDEPENDENT
GROCERY_SHOPPING: INDEPENDENT
TAKING_MEDICATION: INDEPENDENT

## 2025-04-18 NOTE — ASSESSMENT & PLAN NOTE
Chronic, needs better control. Start Tradjenta 5mg daily. Risks and benefits of medication discussed and prescribed. Low carbohydrate diet and increase in activity. Recheck with me in 3 months. Recommend follow up with [facility], patient declines at this time. Follow up with nephrology as scheduled, avoid NSAIDs, stay hydrated.     Orders:    lisinopril 5 mg tablet; Take 1 tablet (5 mg) by mouth once daily.    linaGLIPtin (Tradjenta) 5 mg tablet; Take 1 tablet (5 mg) by mouth once daily.

## 2025-04-18 NOTE — PROGRESS NOTES
Subjective   Reason for Visit: Jose Manuel Andujar is an 75 y.o. male here for a Medicare Wellness visit.     Past Medical, Surgical, and Family History reviewed and updated in chart.    Reviewed all medications by prescribing practitioner or clinical pharmacist (such as prescriptions, OTCs, herbal therapies and supplements) and documented in the medical record.    [facility]    Patient Care Team:  Cayla Kaminski PA-C as PCP - General (Family Medicine)  Sean Dominguez MD as PCP - O Medicare Advantage PCP  DO Jennifer Oneil MD MPH as Consulting Physician (Hematology and Oncology)     Jose Manuel Andujar is seen for his comprehensive physical exam. PMH, SH, FH, medications were reviewed and updated.     CHRONIC ISSUES:   Hypothyroidism: On Levothyroxine 88mcg daily. Tolerating well. Recent TSH WNL.     DM: On no medications. Last A1C - 8.5 up from 6.6. Sugar levels . Last microalbumin - 109 and GFR - 37. On statin/ACEI. Last eye exam - will send results. Last foot exam - today. Denies hypoglycemic incidents, chest pain, shortness of breath, headache. CKD, sees Dr. Anaya in 06/25.     HTN: On Lisinopril 5mg daily. No medication side effects. Home /70-80's. Denies chest pain, heart palpitations, SOB, dizziness, headaches, changes of vision, syncope, leg swelling.     PVD: On Lipitor 40 mg, Plavix 75 mg daily. Recent LDL 70.     IMMUNIZATIONS:   Immunization History   Administered Date(s) Administered    Flu vaccine (IIV4), preservative free *Check age/dose* 11/02/2015    Flu vaccine, quadrivalent, high-dose, preservative free, age 65y+ (FLUZONE) 09/28/2021, 10/27/2022, 10/18/2023    Flu vaccine, trivalent, preservative free, HIGH-DOSE, age 65y+ (Fluzone) 10/23/2016, 10/10/2017, 10/01/2018, 10/21/2019, 10/31/2019, 10/23/2020, 10/18/2024    Influenza, Unspecified 10/06/2009, 11/09/2011    Influenza, injectable, quadrivalent 11/01/2018    Influenza, seasonal, injectable 11/19/2012, 10/09/2013    Moderna  SARS-CoV-2 Vaccination 02/26/2021, 03/26/2021    Pfizer Gray Cap SARS-CoV-2 01/13/2022    Pneumococcal conjugate vaccine, 13-valent (PREVNAR 13) 01/01/2012    Pneumococcal polysaccharide vaccine, 23-valent, age 2 years and older (PNEUMOVAX 23) 11/19/2012, 09/28/2021    Tdap vaccine, age 7 year and older (BOOSTRIX, ADACEL) 07/19/2019    Zoster vaccine, recombinant, adult (SHINGRIX) 03/12/2023, 07/24/2023    Zoster, live 12/07/2015       LUNG CANCER SCREENING (50-77 y.o. with 20+ pack year hx & current smoker or quit <15yrs ago)  History - Tobacco Use - Hide Smokeless[1]  CT scheduled for 5/1/25, Dr. Mckeon oncology. Personal hx lung cancer.    COLORECTAL SCREENING (From 43 or 10yrs younger than family diagnosis)  Last colonoscopy - Cologuard 2020, negative   Next colonoscopy due - No longer indicated  Relevant FHx - None    PROSTATE CANCER SCREENING (From 48 y.o. until 68 y.o.)  Next PSA due - No longer indicated   Relevant FHx - None     Sees eye doctor every 6 months, Dr. Nicholson     Review of Systems   Constitutional:  Negative for chills, fever and unexpected weight change.   HENT:  Negative for congestion, hearing loss, postnasal drip, sore throat and trouble swallowing.    Eyes:  Negative for visual disturbance.   Respiratory:  Negative for cough and shortness of breath.    Cardiovascular:  Negative for chest pain and leg swelling.   Gastrointestinal:  Negative for abdominal pain, blood in stool, nausea and vomiting.   Genitourinary:  Negative for difficulty urinating and hematuria.   Musculoskeletal:  Negative for arthralgias and myalgias.   Skin:  Negative for rash.   Neurological:  Negative for weakness and numbness.   Psychiatric/Behavioral:  Negative for dysphoric mood. The patient is not nervous/anxious.    All other systems reviewed and are negative.    Objective   Vitals:  /80 (BP Location: Left arm)   Pulse 77   Temp 36.3 °C (97.4 °F) (Temporal)   Wt 73.6 kg (162 lb 3.2 oz)   SpO2 90%   BMI  23.27 kg/m²       Physical Exam  Vitals and nursing note reviewed.   Constitutional:       Appearance: Normal appearance.   HENT:      Head: Normocephalic.      Right Ear: Tympanic membrane and ear canal normal.      Left Ear: Tympanic membrane and ear canal normal.      Nose: Nose normal.      Mouth/Throat:      Mouth: Mucous membranes are moist.   Eyes:      Extraocular Movements: Extraocular movements intact.      Conjunctiva/sclera: Conjunctivae normal.      Pupils: Pupils are equal, round, and reactive to light.   Neck:      Vascular: No carotid bruit.   Cardiovascular:      Rate and Rhythm: Normal rate and regular rhythm.      Pulses:           Dorsalis pedis pulses are 2+ on the right side and 2+ on the left side.   Pulmonary:      Effort: Pulmonary effort is normal.      Breath sounds: Normal breath sounds.   Abdominal:      General: Abdomen is flat. Bowel sounds are normal.      Palpations: Abdomen is soft.      Tenderness: There is no abdominal tenderness.   Genitourinary:     Comments: Declines  exam    Musculoskeletal:         General: Normal range of motion.      Cervical back: Normal range of motion and neck supple.      Right lower leg: No edema.      Left lower leg: No edema.   Feet:      Right foot:      Protective Sensation: 3 sites tested.  2 sites sensed.      Left foot:      Protective Sensation: 3 sites tested.  2 sites sensed.   Lymphadenopathy:      Cervical: No cervical adenopathy.   Skin:     General: Skin is warm.   Neurological:      General: No focal deficit present.      Mental Status: He is alert.   Psychiatric:         Mood and Affect: Mood normal.         Assessment & Plan  Well adult exam  Preventative measures discussed. Labs reviewed with patient. Immunizations discussed.          Type 2 diabetes mellitus with stage 3b chronic kidney disease, without long-term current use of insulin (Multi)  Chronic, needs better control. Start Tradjenta 5mg daily. Risks and benefits of  medication discussed and prescribed. Low carbohydrate diet and increase in activity. Recheck with me in 3 months. Recommend follow up with Angelique Haney [facility], patient declines at this time. Follow up with nephrology as scheduled, avoid NSAIDs, stay hydrated.     Orders:    lisinopril 5 mg tablet; Take 1 tablet (5 mg) by mouth once daily.    linaGLIPtin (Tradjenta) 5 mg tablet; Take 1 tablet (5 mg) by mouth once daily.    Peripheral vascular disease, unspecified (CMS-HCC)  Chronic. Continue Lipitor 40 mg, Plavix 75 mg daily. Decrease fast food, fried food, processed foods and large amounts of red meat. Increase lean protein, vegetables and exercise. Recheck in 6 months. Encouraged smoking cessation.          Acquired hypothyroidism  Chronic. Continue Levothyroxine 88. Recheck in 6 months.          Leukocytosis, unspecified type  Labs ordered, will follow up with results.     Orders:    CBC and Auto Differential; Future               [1]   Social History  Tobacco Use   Smoking Status Every Day    Current packs/day: 1.00    Average packs/day: 1 pack/day for 60.3 years (60.3 ttl pk-yrs)    Types: Cigarettes    Start date: 1965    Passive exposure: Current   Smokeless Tobacco Never

## 2025-04-21 ENCOUNTER — TELEPHONE (OUTPATIENT)
Dept: PRIMARY CARE | Facility: CLINIC | Age: 75
End: 2025-04-21
Payer: MEDICARE

## 2025-04-22 ENCOUNTER — TELEMEDICINE (OUTPATIENT)
Dept: PHARMACY | Facility: HOSPITAL | Age: 75
End: 2025-04-22
Payer: MEDICARE

## 2025-04-22 ENCOUNTER — APPOINTMENT (OUTPATIENT)
Dept: PRIMARY CARE | Facility: CLINIC | Age: 75
End: 2025-04-22
Payer: COMMERCIAL

## 2025-04-22 DIAGNOSIS — E11.8 TYPE 2 DIABETES MELLITUS WITH UNSPECIFIED COMPLICATIONS: ICD-10-CM

## 2025-04-22 DIAGNOSIS — I12.9 TYPE 2 DM WITH CKD STAGE 3 AND HYPERTENSION (MULTI): ICD-10-CM

## 2025-04-22 DIAGNOSIS — E11.22 TYPE 2 DM WITH CKD STAGE 3 AND HYPERTENSION (MULTI): ICD-10-CM

## 2025-04-22 DIAGNOSIS — N18.32 TYPE 2 DIABETES MELLITUS WITH STAGE 3B CHRONIC KIDNEY DISEASE, WITHOUT LONG-TERM CURRENT USE OF INSULIN (MULTI): ICD-10-CM

## 2025-04-22 DIAGNOSIS — E11.22 TYPE 2 DIABETES MELLITUS WITH STAGE 3B CHRONIC KIDNEY DISEASE, WITHOUT LONG-TERM CURRENT USE OF INSULIN (MULTI): ICD-10-CM

## 2025-04-22 DIAGNOSIS — N18.30 TYPE 2 DM WITH CKD STAGE 3 AND HYPERTENSION (MULTI): ICD-10-CM

## 2025-04-22 NOTE — PROGRESS NOTES
MEDICATION MANAGEMENT    Jose Manuel Andujar is a 75 y.o. male who was referred by Cayla Kaminski PA-C to complete medication reconciliation and review with the clinical pharmacist.  A comprehensive medication review was completed with the patient via telephone today.  With patient's permission, this is a Telemedicine visit with audio. Provider located at office address. Patient located at their home address. All issues as documented below were discussed and addressed but limited physical exam was performed. If it was felt that the patient should be evaluated via face-to-face office appointment(s) they were directed to appropriate location.  Patient reports financial barrier with current medication.      MEDICATION HISTORY  Allergies[1]  Medications Ordered Prior to Encounter[2]     Patient Assistance Screening (VAF)  Patient verbally reports monthly or yearly income which is less than 400% federal poverty level.  Application for program has been submitted for the following medications:     Tradjenta    Patient has been informed that program team will be reaching out to them to discuss necessary documentation, instructed to answer phone/return voicemail.   Patient aware this process may take up to 6 weeks.   If approved medication must be filled through [facility] pharmacy and may be picked up or mailed to patient.       LABS  There were no vitals taken for this visit.   Lab Results   Component Value Date    HGBA1C 8.5 (H) 04/08/2025    HGBA1C 6.3 (H) 10/09/2024    HGBA1C 6.6 (H) 06/27/2024     Lab Results   Component Value Date    BILITOT 0.5 04/08/2025    CALCIUM 9.3 04/08/2025    CO2 22 04/08/2025     04/08/2025    CREATININE 1.86 (H) 04/08/2025    GLUCOSE 201 (H) 04/08/2025    ALKPHOS 98 04/08/2025    K 5.6 (H) 04/08/2025    PROT 6.3 04/08/2025     04/08/2025    AST 26 04/08/2025    ALT 37 04/08/2025    BUN 37 (H) 04/08/2025    ANIONGAP 9 04/08/2025    MG 2.1 05/04/2021    PHOS 3.1 03/04/2022    ALBUMIN  3.9 04/08/2025    GFRMALE 35 (A) 08/08/2022     Lab Results   Component Value Date    TRIG 141 04/08/2025    CHOL 152 04/08/2025    LDLCALC 70 04/08/2025    HDL 60 04/08/2025         Assessment/Plan    Comments/Recommendations to PCP:  Reconciled medications with patient via telephone today.  Reviewed instructions, MOA, SE and dose for Tradjenta   Patient verbalizes understanding regarding plan of care and all questions answered   4.   Pt will follow up with PCP as scheduled        [facility]aney [facility]    Verbal consent to manage patient's drug therapy was obtained from the patient and/or an individual authorized to act on behalf of a patient. They were informed they may decline to participate or withdraw from participation in pharmacy services at any time.       [1] No Known Allergies  [2]   Current Outpatient Medications on File Prior to Visit   Medication Sig Dispense Refill    aspirin 81 mg EC tablet Take 1 tablet (81 mg) by mouth once daily.      atorvastatin (Lipitor) 40 mg tablet Take 1 tablet (40 mg) by mouth once daily. 90 tablet 0    clopidogrel (Plavix) 75 mg tablet Take 1 tablet (75 mg) by mouth once daily. 90 tablet 0    collagenase (SantyL) 250 unit/gram ointment Apply a nickel thick amount topically to affected area once daily 30 g 1    dorzolamide (Trusopt) 2 % ophthalmic solution 1 drop 2 times a day.      gabapentin (Neurontin) 600 mg tablet TAKE 1 TABLET BY MOUTH EVERY DAY 90 tablet 3    levETIRAcetam (Keppra) 250 mg tablet Take 1 tablet (250 mg) by mouth 2 times a day. 180 tablet 3    levothyroxine (Synthroid, Levoxyl) 88 mcg tablet Take 1 tablet (88 mcg) by mouth early in the morning.. Take on an empty stomach at the same time each day, either 30 to 60 minutes prior to breakfast 90 tablet 0    linaGLIPtin (Tradjenta) 5 mg tablet Take 1 tablet (5 mg) by mouth once daily. 90 tablet 0    lisinopril 5 mg tablet Take 1 tablet (5 mg) by mouth once daily. 90 tablet 1    predniSONE (Deltasone) 5 mg  tablet Take 1 tablet (5 mg) by mouth once daily. 90 tablet 0    [DISCONTINUED] ergocalciferol (Vitamin D-2) 1.25 MG (54811 UT) capsule TAKE 1 CAPSULE BY MOUTH ONE TIME PER WEEK (Patient not taking: Reported on 4/18/2025) 12 capsule 1    [DISCONTINUED] famotidine (Pepcid) 40 mg tablet Take by mouth. (Patient not taking: Reported on 4/18/2025)      [DISCONTINUED] lisinopril 5 mg tablet TAKE 1 TABLET BY MOUTH ONCE DAILY 90 tablet 0     No current facility-administered medications on file prior to visit.

## 2025-04-22 NOTE — PROGRESS NOTES
Please review for internal [employer] (VAF) eligibility. Prescriptions have been sent to [facility].     Jose Manuel Andujar  1950   [id number]  [phone]   [email or web address]  Delivery Preference (if known): MAIL  Priority:  Hold Medication until [facility] approved/denied  Filing Status: Patient does file taxes  Household size: 2  Financial Documents To Be Collected By: Documents attached to email  Medication(s):    Tradjenta    *I have confirmed the above medications are listed on the current VAF formulary: [email or web address]    I acknowledge the [facility] staff will further reach out to the patient to confirm additional details as needed, including but not limited to collecting financial documentation, confirming delivery information, obtaining payment method for non-VAF medications.      Angelique Haney, [facility]

## 2025-04-24 ENCOUNTER — APPOINTMENT (OUTPATIENT)
Dept: NEUROLOGY | Facility: CLINIC | Age: 75
End: 2025-04-24
Payer: MEDICARE

## 2025-04-24 VITALS
DIASTOLIC BLOOD PRESSURE: 60 MMHG | HEART RATE: 74 BPM | SYSTOLIC BLOOD PRESSURE: 124 MMHG | HEIGHT: 70 IN | WEIGHT: 161 LBS | BODY MASS INDEX: 23.05 KG/M2

## 2025-04-24 DIAGNOSIS — G62.9 POLYNEUROPATHY, UNSPECIFIED: ICD-10-CM

## 2025-04-24 DIAGNOSIS — G40.909 SEIZURE DISORDER (MULTI): Primary | ICD-10-CM

## 2025-04-24 PROCEDURE — 3078F DIAST BP <80 MM HG: CPT | Performed by: PSYCHIATRY & NEUROLOGY

## 2025-04-24 PROCEDURE — 3074F SYST BP LT 130 MM HG: CPT | Performed by: PSYCHIATRY & NEUROLOGY

## 2025-04-24 PROCEDURE — 1123F ACP DISCUSS/DSCN MKR DOCD: CPT | Performed by: PSYCHIATRY & NEUROLOGY

## 2025-04-24 PROCEDURE — 99213 OFFICE O/P EST LOW 20 MIN: CPT | Performed by: PSYCHIATRY & NEUROLOGY

## 2025-04-24 PROCEDURE — 4004F PT TOBACCO SCREEN RCVD TLK: CPT | Performed by: PSYCHIATRY & NEUROLOGY

## 2025-04-24 PROCEDURE — 1159F MED LIST DOCD IN RCRD: CPT | Performed by: PSYCHIATRY & NEUROLOGY

## 2025-04-24 RX ORDER — GABAPENTIN 600 MG/1
600 TABLET ORAL 2 TIMES DAILY
Qty: 180 TABLET | Refills: 3 | Status: SHIPPED | OUTPATIENT
Start: 2025-04-24 | End: 2026-04-24

## 2025-04-24 RX ORDER — ACETAMINOPHEN 500 MG
50 TABLET ORAL DAILY
COMMUNITY

## 2025-04-24 NOTE — PROGRESS NOTES
Subjective   Jose Manuel Andujar is a 75 y.o.   male.  HPI  This is a 75 year old man with PN (DM, RA, CKD), and seizure disorder, last seen by Dr. Bravo 3/8/2024.    His seizures are controlled.  His PN causes numbness and tingling, feels like he is walking on rocks.  He denies any symptoms from the gabapentin or Keppra.  He denies any problems with his balance.  Objective   Neurological Exam  Physical Exam  I personally reviewed laboratory, radiographic, and medical studies which were pertinent for nothing.    Assessment/Plan

## 2025-04-24 NOTE — PATIENT INSTRUCTIONS
You are stable with the seizures, however, your symptoms from the peripheral neuropathy could be better controlled.  try the gabapentin at double the dosing- 600 mg twice a day.  Continue the generic Keppra as written, follow up in one year.  Call or contact our office if any questions.

## 2025-04-25 ENCOUNTER — OFFICE VISIT (OUTPATIENT)
Dept: WOUND CARE | Facility: HOSPITAL | Age: 75
End: 2025-04-25
Payer: MEDICARE

## 2025-04-25 PROCEDURE — 11042 DBRDMT SUBQ TIS 1ST 20SQCM/<: CPT

## 2025-04-29 DIAGNOSIS — E11.22 TYPE 2 DIABETES MELLITUS WITH STAGE 3B CHRONIC KIDNEY DISEASE, WITHOUT LONG-TERM CURRENT USE OF INSULIN (MULTI): ICD-10-CM

## 2025-04-29 DIAGNOSIS — N18.32 TYPE 2 DIABETES MELLITUS WITH STAGE 3B CHRONIC KIDNEY DISEASE, WITHOUT LONG-TERM CURRENT USE OF INSULIN (MULTI): ICD-10-CM

## 2025-04-29 PROBLEM — S00.03XA CONTUSION OF SCALP: Status: ACTIVE | Noted: 2025-04-29

## 2025-04-29 RX ORDER — LINAGLIPTIN 5 MG/1
5 TABLET, FILM COATED ORAL DAILY
Qty: 90 TABLET | Refills: 3 | Status: SHIPPED | OUTPATIENT
Start: 2025-04-29 | End: 2026-04-24

## 2025-05-01 ENCOUNTER — HOSPITAL ENCOUNTER (OUTPATIENT)
Dept: RADIOLOGY | Facility: CLINIC | Age: 75
Discharge: HOME | End: 2025-05-01
Payer: MEDICARE

## 2025-05-01 DIAGNOSIS — C34.11: ICD-10-CM

## 2025-05-01 PROCEDURE — 71250 CT THORAX DX C-: CPT

## 2025-05-02 ENCOUNTER — OFFICE VISIT (OUTPATIENT)
Dept: WOUND CARE | Facility: HOSPITAL | Age: 75
End: 2025-05-02
Payer: MEDICARE

## 2025-05-02 PROCEDURE — 11042 DBRDMT SUBQ TIS 1ST 20SQCM/<: CPT

## 2025-05-05 ENCOUNTER — TELEPHONE (OUTPATIENT)
Dept: PRIMARY CARE | Facility: CLINIC | Age: 75
End: 2025-05-05
Payer: MEDICARE

## 2025-05-05 PROCEDURE — RXMED WILLOW AMBULATORY MEDICATION CHARGE

## 2025-05-05 NOTE — TELEPHONE ENCOUNTER
Patient is calling to find out if his Prescription for Tradjenta 5 MG was approved for no cost    Patient can be reached at [phone]

## 2025-05-06 ENCOUNTER — APPOINTMENT (OUTPATIENT)
Dept: UROLOGY | Facility: CLINIC | Age: 75
End: 2025-05-06
Payer: COMMERCIAL

## 2025-05-06 LAB
BASOPHILS # BLD AUTO: 46 CELLS/UL (ref 0–200)
BASOPHILS NFR BLD AUTO: 0.4 %
EOSINOPHIL # BLD AUTO: 34 CELLS/UL (ref 15–500)
EOSINOPHIL NFR BLD AUTO: 0.3 %
ERYTHROCYTE [DISTWIDTH] IN BLOOD BY AUTOMATED COUNT: 12.7 % (ref 11–15)
HCT VFR BLD AUTO: 33.2 % (ref 38.5–50)
HGB BLD-MCNC: 10.7 G/DL (ref 13.2–17.1)
LYMPHOCYTES # BLD AUTO: 764 CELLS/UL (ref 850–3900)
LYMPHOCYTES NFR BLD AUTO: 6.7 %
MCH RBC QN AUTO: 31.6 PG (ref 27–33)
MCHC RBC AUTO-ENTMCNC: 32.2 G/DL (ref 32–36)
MCV RBC AUTO: 97.9 FL (ref 80–100)
MONOCYTES # BLD AUTO: 559 CELLS/UL (ref 200–950)
MONOCYTES NFR BLD AUTO: 4.9 %
NEUTROPHILS # BLD AUTO: 9998 CELLS/UL (ref 1500–7800)
NEUTROPHILS NFR BLD AUTO: 87.7 %
PLATELET # BLD AUTO: 302 THOUSAND/UL (ref 140–400)
PMV BLD REES-ECKER: 9.4 FL (ref 7.5–12.5)
RBC # BLD AUTO: 3.39 MILLION/UL (ref 4.2–5.8)
WBC # BLD AUTO: 11.4 THOUSAND/UL (ref 3.8–10.8)

## 2025-05-06 NOTE — TELEPHONE ENCOUNTER
Called and informed pt. He states he received a call that the medication was approved and is on it's way.

## 2025-05-07 NOTE — PROGRESS NOTES
Subjective:   Patient Name: Jose Manuel Andujar    : 1950     MRN: [id number]     Age: 75 y.o.     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 completed 2018.     Presenting History (2024): At time of initial presentation a 75 y.o. male, current smoker (started at age 15, 1 pack per day) with a past medical history of RA, BPH, HLD, HTN, DM2, GERD, PAD, hypothyroidism, referred for management of lung cancer    2018: RUL biopsy: fragments of necrotic tissue and partially hyalinized and inflamed fibrous connective tissue.  4Rmetaplastic squamous cells and rare bronchial epithelial cells.     2018: RUL lobectomy, tumor measures 5.5x4.x4cm, squamous cell carcinoma, moderately to poorly differentiated, VPI +, LVI indeterminate. 2 Peribronchial Lns were positive. 3 hilar Lns were negative.   LN positive total. [id number]    No cough or hemoptis. No SOB. No n/v/d/c. No fever or chills. Eating and voiding well. Energy level is fair. He continues to smoke every day. No intention to quit.    PMHx: RA, BPH, HLD, T2DM, GERD, HTN, PAD, hypothyroidism  PSHx: 2018 RUL lobectomy, cholecystectomy, knee replacements (MIKAELA), hammertoe (L foot), stent in left leg, MIKAELA inguinal hernia repair, carotid artery (), UroLift (11/3/20)   FHx:  No family hx of cancer.   SHx:  Continues to smoke - 1PPD. started 1966 - 57 year hx, no illicit drug use    Treatment Summary:  Adjuvant carboplatin/Gemzar and RT completed 2018  current - yearly surveillance     Interval History (2025):  He presented here for a follow up. He reported L knee infection started 2 months ago and continues to have wound issues. He has uncontrolled DM which is managed by his PCP. He denies any cough. + ABBOTT but chronic. No hemoptysis. Eating voiding well. No n/v. No fever or chills. He continues  to smoke 1 pack per day. Not interested in quitting.       ROS:  Patient denies the below review of systems, except for changes as noted in the interval history.    General:  Fatigue, anorexia, fevers, sweats, chills, heat/cold intolerance, weight changes.  HEENT:  Icterus, congestion, sore throat, rhinorrhea, taste change, voice changes.  Neurology:  Headache, dizziness, vision changes, hearing changes, other motor/sensory loss, gait instability, neuropathies.  Pulmonology:  Cough, wheezing, hemoptysis, dyspnea at rest, dyspnea on exertion, pleuritic chest pain.  Cardiology:  Chest pain, palpitations, orthopnea, paroxysmal nocturnal dyspnea.  Gastroenterology:  Nausea, vomiting, reflux symptoms, abdominal pain, constipation, diarrhea, melena, bright red blood per rectum.  Genitourinary:  Dysuria, polyuria, urine color change, urine smell change, hematuria.   Musculoskeletal:  Arthralgias, myalgias, joint swelling, focal weakness.  Skin:  Rash, pruritis, jaundice, petechiae, nail changes, skin nodules/growth.  Psychology:  Anxiety, depression, suicidal ideation, homicidal ideation.  Heme:  Easy bleeding or bruising.  Others:   All other systems reviewed and are negative.    Medical History:   Past Medical History:   Diagnosis Date    Arthritis     Cancer (Multi)     Cataract     Disease of thyroid gland     Encounter for screening for malignant neoplasm of prostate     Screening PSA (prostate specific antigen)    Glaucoma     Hyperlipidemia, unspecified 12/08/2013    Hyperlipidemia    Hypertension     Personal history of colonic polyps     History of colonic polyps    Stroke (Multi)        Surgical History:   Past Surgical History:   Procedure Laterality Date    CHOLECYSTECTOMY  02/10/2014    Cholecystectomy    CT GUIDED IMAGING FOR NEEDLE PLACEMENT  3/28/2018    CT GUIDED IMAGING FOR NEEDLE PLACEMENT [facility]    ESOPHAGOGASTRODUODENOSCOPY  02/10/2014    Diagnostic Esophagogastroduodenoscopy    HERNIA  REPAIR      JOINT REPLACEMENT      KNEE ARTHROSCOPY W/ DEBRIDEMENT  02/10/2014    Arthroscopy Knee    MR HEAD ANGIO WO IV CONTRAST  5/8/2018    MR HEAD ANGIO WO IV CONTRAST [facility]    OTHER SURGICAL HISTORY  02/10/2014    Type Of Stress Test    PROSTATE SURGERY      REFRACTIVE SURGERY  02/10/2014    Corneal LASIK    ROTATOR CUFF REPAIR  02/10/2014    Rotator Cuff Repair    TOTAL KNEE ARTHROPLASTY  02/10/2014    Total Knee Arthroplasty       Family History:  No family history on file.    Allergies:  No Known Allergies    Meds (Current):    Current Outpatient Medications:     aspirin 81 mg EC tablet, Take 1 tablet (81 mg) by mouth once daily., Disp: , Rfl:     atorvastatin (Lipitor) 40 mg tablet, Take 1 tablet (40 mg) by mouth once daily., Disp: 90 tablet, Rfl: 0    cholecalciferol (Vitamin D3) 50 mcg (2,000 units) capsule, Take 1 capsule (50 mcg) by mouth once daily., Disp: , Rfl:     clopidogrel (Plavix) 75 mg tablet, Take 1 tablet (75 mg) by mouth once daily., Disp: 90 tablet, Rfl: 0    collagenase (SantyL) 250 unit/gram ointment, Apply a nickel thick amount topically to affected area once daily, Disp: 30 g, Rfl: 1    dorzolamide (Trusopt) 2 % ophthalmic solution, 1 drop 2 times a day., Disp: , Rfl:     gabapentin (Neurontin) 600 mg tablet, Take 1 tablet (600 mg) by mouth 2 times a day., Disp: 180 tablet, Rfl: 3    levETIRAcetam (Keppra) 250 mg tablet, Take 1 tablet (250 mg) by mouth 2 times a day., Disp: 180 tablet, Rfl: 3    levothyroxine (Synthroid, Levoxyl) 88 mcg tablet, Take 1 tablet (88 mcg) by mouth early in the morning.. Take on an empty stomach at the same time each day, either 30 to 60 minutes prior to breakfast, Disp: 90 tablet, Rfl: 0    linaGLIPtin (Tradjenta) 5 mg tablet, Take 1 tablet (5 mg) by mouth once daily., Disp: 90 tablet, Rfl: 3    lisinopril 5 mg tablet, Take 1 tablet (5 mg) by mouth once daily., Disp: 90 tablet, Rfl: 1    predniSONE (Deltasone) 5 mg tablet, Take 1 tablet (5  mg) by mouth once daily., Disp: 90 tablet, Rfl: 0    Pain Assessment:  Pain is:0     Performance Status (ECOG):1         ECOG Definition  0 Fully active; no performance restrictions.  1 Strenuous physical activity restricted; fully ambulatory and able to carry out light work.  2 Capable of all self-care but unable to carry out any work activities. Up and about >50% of waking hours.  3 Capable of only limited self-care; confined to bed or chair >50% of waking hours.  4 Completely disabled; cannot carry out any self-care; totally confined to bed or chair.      Exam:  There were no vitals taken for this visit.  General: Well appearing, no acute distress  Neurology: Alert and oriented x3, CN II-XII grossly intact  Psychology: Affect appropriate  Eyes: PERRL, EOMI  ENT: Mucus membranes moist and intact, no ulcers  Lymphatics: No palpable adenopathy  Neck: Supple, no masses  Respiratory: Lungs clear bilaterally, no wheezes, no rales, no rhonchi  Cardiovascular: Normal rate and rhythm, no murmur  Abdomen: Soft, non-tender, non-distended, normoactive, no hepatosplenomegaly, no ascites  Musculoskeletal: Normal gait and stance  Extremities: No clubbing, no cyanosis, no edema  Skin: No rash  Genitourinary: Deferred  Rectal: Deferred   Pelvic: Deferred  Breasts: Deferred    Labs:  No visits with results within 3 Day(s) from this visit.   Latest known visit with results is:   Office Visit on 04/18/2025   Component Date Value Ref Range Status    WHITE BLOOD CELL COUNT 05/05/2025 11.4 (H)  3.8 - 10.8 Thousand/uL Final    RED BLOOD CELL COUNT 05/05/2025 3.39 (L)  4.20 - 5.80 Million/uL Final    HEMOGLOBIN 05/05/2025 10.7 (L)  13.2 - 17.1 g/dL Final    HEMATOCRIT 05/05/2025 33.2 (L)  38.5 - 50.0 % Final    MCV 05/05/2025 97.9  80.0 - 100.0 fL Final    MCH 05/05/2025 31.6  27.0 - 33.0 pg Final    MCHC 05/05/2025 32.2  32.0 - 36.0 g/dL Final    Comment: For adults, a slight decrease in the calculated MCHC  value (in the range of 30 to  32 g/dL) is most likely  not clinically significant; however, it should be  interpreted with caution in correlation with other  red cell parameters and the patient's clinical  condition.      RDW 05/05/2025 12.7  11.0 - 15.0 % Final    PLATELET COUNT 05/05/2025 302  140 - 400 Thousand/uL Final    MPV 05/05/2025 9.4  7.5 - 12.5 fL Final    ABSOLUTE NEUTROPHILS 05/05/2025 9,998 (H)  1,500 - 7,800 cells/uL Final    ABSOLUTE LYMPHOCYTES 05/05/2025 764 (L)  850 - 3,900 cells/uL Final    ABSOLUTE MONOCYTES 05/05/2025 559  200 - 950 cells/uL Final    ABSOLUTE EOSINOPHILS 05/05/2025 34  15 - 500 cells/uL Final    ABSOLUTE BASOPHILS 05/05/2025 46  0 - 200 cells/uL Final    NEUTROPHILS 05/05/2025 87.7  % Final    LYMPHOCYTES 05/05/2025 6.7  % Final    MONOCYTES 05/05/2025 4.9  % Final    EOSINOPHILS 05/05/2025 0.3  % Final    BASOPHILS 05/05/2025 0.4  % Final        Assessment/Plan:   75 y.o. male with past medical history as stated referred for management of lung cancer    The patient's records and imaging have been reviewed in detail.  I have discussed with the patient the natural history of their disease at length.  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 completed 11/2018. ADRIAN.     - Interval Imaging: CT  5/1/2025 chest (-) 1. Postoperative changes, as above. 2. Unchanged appearance of dense consolidation in the medial aspect of the right lung, most consistent in appearance with radiation fibrosis. 3. Focal airspace consolidation at the lateral right lung base, may represent developing pneumonia. Clinical correlation and continued follow-up until clearing is recommended. 4. No definite new or enlarging pulmonary nodules or lymphadenopathy identified.    # Clinical Trials:  None currently.     # Access: Peripheral veins.    # Pain: No acute pain.    # Bone Health:  No signs of bony disease.     # CKD: follows with nephrology    # Psychosocial: Coping well, no acute issues.  Good support from family & friends.  Social work support offered.    # GI/CINV: No acute issues.  Eating and voiding well.  Nutrition consult offered.    # Smoking: N/A, current smoker. No intention to quit. No interest in nicotine patch or gums. Smoking cessation counseling provided.    # Dispo: RTC in 1 year with CT chest

## 2025-05-08 ENCOUNTER — OFFICE VISIT (OUTPATIENT)
Dept: HEMATOLOGY/ONCOLOGY | Facility: CLINIC | Age: 75
End: 2025-05-08
Payer: MEDICARE

## 2025-05-08 ENCOUNTER — PHARMACY VISIT (OUTPATIENT)
Dept: PHARMACY | Facility: CLINIC | Age: 75
End: 2025-05-08
Payer: COMMERCIAL

## 2025-05-08 VITALS
HEIGHT: 69 IN | HEART RATE: 63 BPM | RESPIRATION RATE: 18 BRPM | BODY MASS INDEX: 23.8 KG/M2 | SYSTOLIC BLOOD PRESSURE: 121 MMHG | DIASTOLIC BLOOD PRESSURE: 66 MMHG | TEMPERATURE: 98.1 F | OXYGEN SATURATION: 95 % | WEIGHT: 160.72 LBS

## 2025-05-08 DIAGNOSIS — Z71.6 ENCOUNTER FOR SMOKING CESSATION COUNSELING: ICD-10-CM

## 2025-05-08 DIAGNOSIS — C34.11: Primary | ICD-10-CM

## 2025-05-08 PROCEDURE — G2211 COMPLEX E/M VISIT ADD ON: HCPCS | Performed by: STUDENT IN AN ORGANIZED HEALTH CARE EDUCATION/TRAINING PROGRAM

## 2025-05-08 PROCEDURE — 99215 OFFICE O/P EST HI 40 MIN: CPT | Performed by: STUDENT IN AN ORGANIZED HEALTH CARE EDUCATION/TRAINING PROGRAM

## 2025-05-08 PROCEDURE — 1126F AMNT PAIN NOTED NONE PRSNT: CPT | Performed by: STUDENT IN AN ORGANIZED HEALTH CARE EDUCATION/TRAINING PROGRAM

## 2025-05-08 PROCEDURE — 4004F PT TOBACCO SCREEN RCVD TLK: CPT | Performed by: STUDENT IN AN ORGANIZED HEALTH CARE EDUCATION/TRAINING PROGRAM

## 2025-05-08 PROCEDURE — 3078F DIAST BP <80 MM HG: CPT | Performed by: STUDENT IN AN ORGANIZED HEALTH CARE EDUCATION/TRAINING PROGRAM

## 2025-05-08 PROCEDURE — 3074F SYST BP LT 130 MM HG: CPT | Performed by: STUDENT IN AN ORGANIZED HEALTH CARE EDUCATION/TRAINING PROGRAM

## 2025-05-08 ASSESSMENT — PAIN SCALES - GENERAL: PAINLEVEL_OUTOF10: 0-NO PAIN

## 2025-05-08 NOTE — PROGRESS NOTES
Patient here for follow up visit Squamous cell bronchus.  Currently on surveillance.    Denies N/V/D/C  Denies pain  Will start tragenta this Friday per PCP.   Wound on left knee followed by wound care.  Here to review recent scans.      Patient here with wife Jena.    Medications and Allergies reviewed and reconciled this visit.    Follow up per MD request. Follow up visit 1 year with CT.     Patient reports availability and use of mychart, Reviewed this is a good place to communicate with the team as well as review labs and upcoming orders.      No barriers to education noted, patient agrees to current plan and verbalized understanding using teach back method.

## 2025-05-09 ENCOUNTER — OFFICE VISIT (OUTPATIENT)
Dept: WOUND CARE | Facility: HOSPITAL | Age: 75
End: 2025-05-09
Payer: MEDICARE

## 2025-05-09 PROCEDURE — 11042 DBRDMT SUBQ TIS 1ST 20SQCM/<: CPT

## 2025-05-15 ENCOUNTER — OFFICE VISIT (OUTPATIENT)
Dept: PRIMARY CARE | Facility: CLINIC | Age: 75
End: 2025-05-15
Payer: MEDICARE

## 2025-05-15 VITALS
HEART RATE: 61 BPM | TEMPERATURE: 97.2 F | OXYGEN SATURATION: 98 % | SYSTOLIC BLOOD PRESSURE: 115 MMHG | DIASTOLIC BLOOD PRESSURE: 70 MMHG | WEIGHT: 160.8 LBS | BODY MASS INDEX: 23.95 KG/M2

## 2025-05-15 DIAGNOSIS — B02.9 HERPES ZOSTER WITHOUT COMPLICATION: Primary | ICD-10-CM

## 2025-05-15 PROCEDURE — 3078F DIAST BP <80 MM HG: CPT

## 2025-05-15 PROCEDURE — 1125F AMNT PAIN NOTED PAIN PRSNT: CPT

## 2025-05-15 PROCEDURE — 99213 OFFICE O/P EST LOW 20 MIN: CPT

## 2025-05-15 PROCEDURE — 3074F SYST BP LT 130 MM HG: CPT

## 2025-05-15 PROCEDURE — 4004F PT TOBACCO SCREEN RCVD TLK: CPT

## 2025-05-15 PROCEDURE — 1159F MED LIST DOCD IN RCRD: CPT

## 2025-05-15 RX ORDER — LATANOPROST 50 UG/ML
1 SOLUTION/ DROPS OPHTHALMIC NIGHTLY
COMMUNITY
Start: 2025-05-02

## 2025-05-15 RX ORDER — VALACYCLOVIR HYDROCHLORIDE 1 G/1
1000 TABLET, FILM COATED ORAL 2 TIMES DAILY
Qty: 14 TABLET | Refills: 0 | Status: SHIPPED | OUTPATIENT
Start: 2025-05-15 | End: 2025-05-22

## 2025-05-15 ASSESSMENT — PAIN SCALES - GENERAL: PAINLEVEL_OUTOF10: 2

## 2025-05-15 NOTE — PROGRESS NOTES
Subjective   Patient ID: Jose Manuel Andujar is a 75 y.o. male who presents for Rash (Pt started the tradjenta sunday 5/11/2025 and has noticed bumps on back of neck Monday and increasing more each day.).    HPI   Jose Manuel is seen with his wife for rash on back of his neck. Started Tradjenta on Sunday 5/11 and bumps appeared on Tuesday. Have gotten worse. Some tenderness. Sensitive to touch. Has never had shingles, received vaccine x2. Denies fever, chills, rash on other parts of body, SOB.     Review of Systems  All other systems have been reviewed and are negative except as noted in the HPI.     Objective   /70   Pulse 61   Temp 36.2 °C (97.2 °F) (Temporal)   Wt 72.9 kg (160 lb 12.8 oz)   SpO2 98%   BMI 23.95 kg/m²     Physical Exam  Vitals and nursing note reviewed.   Constitutional:       General: He is not in acute distress.     Appearance: Normal appearance.   HENT:      Right Ear: Tympanic membrane and ear canal normal.      Left Ear: Tympanic membrane and ear canal normal.      Mouth/Throat:      Mouth: Mucous membranes are moist.   Eyes:      Extraocular Movements: Extraocular movements intact.      Conjunctiva/sclera: Conjunctivae normal.   Cardiovascular:      Rate and Rhythm: Normal rate.   Pulmonary:      Effort: Pulmonary effort is normal.   Musculoskeletal:      Cervical back: Neck supple.   Skin:     Findings: Rash present. Rash is vesicular.             Comments: Vesicular rash of C2, C3 dermatome. Tender to light touch. No fluctuance, signs of bacterial infection noted on exam.    Neurological:      General: No focal deficit present.      Mental Status: He is alert.   Psychiatric:         Mood and Affect: Mood normal.         Assessment/Plan   Assessment & Plan  Herpes zoster without complication  Acute. I have a low suspicion rash is secondary to Tradjenta. Suspect herpes zoster. May stop Tradjenta until rash resolves and restart after to see if rash returns.   Valtrex as directed, adjusted for  CrCl. Risks and benefits of medication discussed and prescribed.   Discussed transmission and precautions. Avoid picking at lesions to prevent secondary bacterial infection.   Follow up if no improvement in 72 hours or sooner for worsening.     Orders:    valACYclovir (Valtrex) 1 gram tablet; Take 1 tablet (1,000 mg) by mouth 2 times a day for 7 days.

## 2025-05-16 ENCOUNTER — APPOINTMENT (OUTPATIENT)
Dept: WOUND CARE | Facility: HOSPITAL | Age: 75
End: 2025-05-16
Payer: MEDICARE

## 2025-05-17 DIAGNOSIS — I65.29 CAROTID ARTERY CALCIFICATION, UNSPECIFIED LATERALITY: ICD-10-CM

## 2025-05-17 DIAGNOSIS — I73.9 PERIPHERAL VASCULAR DISEASE, UNSPECIFIED: ICD-10-CM

## 2025-05-17 DIAGNOSIS — I73.9 PERIPHERAL VASCULAR DISEASE: ICD-10-CM

## 2025-05-19 RX ORDER — ATORVASTATIN CALCIUM 40 MG/1
40 TABLET, FILM COATED ORAL DAILY
Qty: 90 TABLET | Refills: 1 | Status: SHIPPED | OUTPATIENT
Start: 2025-05-19

## 2025-05-19 RX ORDER — CLOPIDOGREL BISULFATE 75 MG/1
75 TABLET ORAL DAILY
Qty: 90 TABLET | Refills: 1 | Status: SHIPPED | OUTPATIENT
Start: 2025-05-19

## 2025-05-23 ENCOUNTER — OFFICE VISIT (OUTPATIENT)
Dept: WOUND CARE | Facility: HOSPITAL | Age: 75
End: 2025-05-23
Payer: MEDICARE

## 2025-05-23 PROCEDURE — 11042 DBRDMT SUBQ TIS 1ST 20SQCM/<: CPT

## 2025-05-30 ENCOUNTER — OFFICE VISIT (OUTPATIENT)
Dept: WOUND CARE | Facility: HOSPITAL | Age: 75
End: 2025-05-30
Payer: MEDICARE

## 2025-05-30 PROCEDURE — 11042 DBRDMT SUBQ TIS 1ST 20SQCM/<: CPT

## 2025-06-05 ENCOUNTER — TELEPHONE (OUTPATIENT)
Dept: HEMATOLOGY/ONCOLOGY | Facility: CLINIC | Age: 75
End: 2025-06-05
Payer: MEDICARE

## 2025-06-05 DIAGNOSIS — E55.9 VITAMIN D DEFICIENCY: ICD-10-CM

## 2025-06-05 DIAGNOSIS — D61.818 PANCYTOPENIA: ICD-10-CM

## 2025-06-05 DIAGNOSIS — E53.8 VITAMIN B12 DEFICIENCY: ICD-10-CM

## 2025-06-05 NOTE — TELEPHONE ENCOUNTER
Reason for Conversation  anemia     Background   Patient called and stated he saw his Nephrologist this morning Dr. Sims and he is wondering what Dr. Mckeon is going to do with his anemia. Please advise. Thank you.     Disposition   No disposition on file.

## 2025-06-06 ENCOUNTER — OFFICE VISIT (OUTPATIENT)
Dept: WOUND CARE | Facility: HOSPITAL | Age: 75
End: 2025-06-06
Payer: MEDICARE

## 2025-06-06 PROCEDURE — 11042 DBRDMT SUBQ TIS 1ST 20SQCM/<: CPT

## 2025-06-09 ENCOUNTER — TELEPHONE (OUTPATIENT)
Dept: PRIMARY CARE | Facility: CLINIC | Age: 75
End: 2025-06-09
Payer: MEDICARE

## 2025-06-09 DIAGNOSIS — E03.9 ACQUIRED HYPOTHYROIDISM: ICD-10-CM

## 2025-06-09 NOTE — TELEPHONE ENCOUNTER
Patient called for refill of levothyroxine (Synthroid, Levoxyl) 88 mcg tablet. Susannah Oscar.     Last OV: 5/15/25

## 2025-06-10 RX ORDER — LEVOTHYROXINE SODIUM 88 UG/1
88 TABLET ORAL DAILY
Qty: 90 TABLET | Refills: 0 | Status: SHIPPED | OUTPATIENT
Start: 2025-06-10 | End: 2025-09-08

## 2025-06-13 ENCOUNTER — OFFICE VISIT (OUTPATIENT)
Dept: WOUND CARE | Facility: HOSPITAL | Age: 75
End: 2025-06-13
Payer: MEDICARE

## 2025-06-13 PROCEDURE — 11042 DBRDMT SUBQ TIS 1ST 20SQCM/<: CPT

## 2025-06-20 ENCOUNTER — OFFICE VISIT (OUTPATIENT)
Dept: WOUND CARE | Facility: HOSPITAL | Age: 75
End: 2025-06-20
Payer: MEDICARE

## 2025-06-20 PROCEDURE — 11042 DBRDMT SUBQ TIS 1ST 20SQCM/<: CPT

## 2025-06-28 DIAGNOSIS — E11.22 TYPE 2 DIABETES MELLITUS WITH STAGE 3B CHRONIC KIDNEY DISEASE, WITHOUT LONG-TERM CURRENT USE OF INSULIN (MULTI): ICD-10-CM

## 2025-06-28 DIAGNOSIS — I10 PRIMARY HYPERTENSION: ICD-10-CM

## 2025-06-28 DIAGNOSIS — N18.32 TYPE 2 DIABETES MELLITUS WITH STAGE 3B CHRONIC KIDNEY DISEASE, WITHOUT LONG-TERM CURRENT USE OF INSULIN (MULTI): ICD-10-CM

## 2025-07-01 ENCOUNTER — OFFICE VISIT (OUTPATIENT)
Dept: WOUND CARE | Facility: HOSPITAL | Age: 75
End: 2025-07-01
Payer: MEDICARE

## 2025-07-01 PROCEDURE — 99213 OFFICE O/P EST LOW 20 MIN: CPT

## 2025-07-03 ENCOUNTER — APPOINTMENT (OUTPATIENT)
Dept: HEMATOLOGY/ONCOLOGY | Facility: CLINIC | Age: 75
End: 2025-07-03
Payer: MEDICARE

## 2025-07-07 ENCOUNTER — OFFICE VISIT (OUTPATIENT)
Dept: HEMATOLOGY/ONCOLOGY | Facility: CLINIC | Age: 75
End: 2025-07-07
Payer: MEDICARE

## 2025-07-07 VITALS
OXYGEN SATURATION: 100 % | DIASTOLIC BLOOD PRESSURE: 62 MMHG | HEART RATE: 78 BPM | WEIGHT: 157.52 LBS | RESPIRATION RATE: 18 BRPM | BODY MASS INDEX: 23.46 KG/M2 | SYSTOLIC BLOOD PRESSURE: 130 MMHG | TEMPERATURE: 97.2 F

## 2025-07-07 DIAGNOSIS — D50.8 OTHER IRON DEFICIENCY ANEMIA: Primary | ICD-10-CM

## 2025-07-07 DIAGNOSIS — M06.9 RHEUMATOID ARTHRITIS INVOLVING MULTIPLE SITES, UNSPECIFIED WHETHER RHEUMATOID FACTOR PRESENT (MULTI): ICD-10-CM

## 2025-07-07 DIAGNOSIS — E53.8 VITAMIN B12 DEFICIENCY: ICD-10-CM

## 2025-07-07 DIAGNOSIS — N18.32 STAGE 3B CHRONIC KIDNEY DISEASE (MULTI): ICD-10-CM

## 2025-07-07 DIAGNOSIS — D64.9 ANEMIA, UNSPECIFIED TYPE: ICD-10-CM

## 2025-07-07 DIAGNOSIS — D61.818 PANCYTOPENIA: ICD-10-CM

## 2025-07-07 DIAGNOSIS — E55.9 VITAMIN D DEFICIENCY: ICD-10-CM

## 2025-07-07 PROCEDURE — 1126F AMNT PAIN NOTED NONE PRSNT: CPT | Performed by: NURSE PRACTITIONER

## 2025-07-07 PROCEDURE — 99214 OFFICE O/P EST MOD 30 MIN: CPT | Performed by: NURSE PRACTITIONER

## 2025-07-07 PROCEDURE — 3078F DIAST BP <80 MM HG: CPT | Performed by: NURSE PRACTITIONER

## 2025-07-07 PROCEDURE — 3075F SYST BP GE 130 - 139MM HG: CPT | Performed by: NURSE PRACTITIONER

## 2025-07-07 RX ORDER — PREDNISONE 5 MG/1
5 TABLET ORAL DAILY
Qty: 90 TABLET | Refills: 0 | Status: SHIPPED | OUTPATIENT
Start: 2025-07-07

## 2025-07-07 RX ORDER — SODIUM BICARBONATE 650 MG/1
2 TABLET ORAL
COMMUNITY
Start: 2025-06-05

## 2025-07-07 RX ORDER — FERROUS SULFATE 325(65) MG
325 TABLET ORAL
COMMUNITY

## 2025-07-07 ASSESSMENT — PAIN SCALES - GENERAL: PAINLEVEL_OUTOF10: 0-NO PAIN

## 2025-07-07 NOTE — PROGRESS NOTES
Subjective:   Patient Name: Jose Manuel Andujar    : 1950     MRN: [id number]     Age: 75 y.o.     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 completed 2018.     Presenting History (2024): At time of initial presentation a 75 y.o. male, current smoker (started at age 15, 1 pack per day) with a past medical history of RA, BPH, HLD, HTN, DM2, GERD, PAD, hypothyroidism, referred for management of lung cancer    2018: RUL biopsy: fragments of necrotic tissue and partially hyalinized and inflamed fibrous connective tissue.  4Rmetaplastic squamous cells and rare bronchial epithelial cells.     2018: RUL lobectomy, tumor measures 5.5x4.x4cm, squamous cell carcinoma, moderately to poorly differentiated, VPI +, LVI indeterminate. 2 Peribronchial Lns were positive. 3 hilar Lns were negative.   LN positive total. [id number]    No cough or hemoptis. No SOB. No n/v/d/c. No fever or chills. Eating and voiding well. Energy level is fair. He continues to smoke every day. No intention to quit.    PMHx: RA, BPH, HLD, T2DM, GERD, HTN, PAD, hypothyroidism  PSHx: 2018 RUL lobectomy, cholecystectomy, knee replacements (MIKAELA), hammertoe (L foot), stent in left leg, MIKAELA inguinal hernia repair, carotid artery (), UroLift (11/3/20)   FHx:  No family hx of cancer.   SHx:  Continues to smoke - 1PPD. started 1966 - 57 year hx, no illicit drug use    Treatment Summary:  Adjuvant carboplatin/Gemzar and RT completed 2018  current - yearly surveillance     Interval History (2025):    Patient is seen today for concern for anemia. He contacted the office in  after seeing his Nephrologist, Dr. Sims. Last CBC was completed in May hemoglobin was 10.7 and hematocrit 33.2. On chart review his anemia dates back to at least  at which time he received adjuvant  carboplatin + Gemzar and radiation therapy. Anemia predates chemotherapy. Unable to locate CBC prior to 1/2018. This is also in the setting of chronic kidney disease. DM managed by PCP, most recent hemoglobin A1C 8.5. Reports AM blood glucose around 100. Patient also with history of RA on daily prednisone     Most recent vitamin B12 level was completed in May 2021 and was found to be 289.  Iron studies completed in April 2025 Iron 57, TIBC 356, % sat 16. Ferritin not drawn, maybe falsely elevated due to RA. He has been taking oral iron for about 1 month as he recalls being told he was iron deficient in 2018. No labs available to confirm. Not currently iron deficient to cause current level of anemia. No s/s active bleeding.     Chronic kidney disease, stage 3b     PRBC transfusion in October 2018 x 2 units     We typically follow him for lung cancer next CT chest due in May 2026. Previous L knee infection healed, no longer following in wound clinic. He denies any cough. + ABBOTT but chronic. No hemoptysis. Eating voiding well. No n/v. No fever or chills. He continues to smoke 1 pack per day. Not interested in quitting.         Review of Systems:  A review of systems has been completed and are negative for complaints except what is stated in the HPI and/or past medical history      Medical History:   Past Medical History:   Diagnosis Date    Arthritis     Cancer (Multi)     Cataract     Disease of thyroid gland     Encounter for screening for malignant neoplasm of prostate     Screening PSA (prostate specific antigen)    Glaucoma     Hyperlipidemia, unspecified 12/08/2013    Hyperlipidemia    Hypertension     Personal history of colonic polyps     History of colonic polyps    Stroke (Multi)        Surgical History:   Past Surgical History:   Procedure Laterality Date    CHOLECYSTECTOMY  02/10/2014    Cholecystectomy    CT GUIDED IMAGING FOR NEEDLE PLACEMENT  3/28/2018    CT GUIDED IMAGING FOR NEEDLE PLACEMENT [facility]    ESOPHAGOGASTRODUODENOSCOPY  02/10/2014    Diagnostic Esophagogastroduodenoscopy    HERNIA REPAIR      JOINT REPLACEMENT      KNEE ARTHROSCOPY W/ DEBRIDEMENT  02/10/2014    Arthroscopy Knee    MR HEAD ANGIO WO IV CONTRAST  2018    MR HEAD ANGIO WO IV CONTRAST [facility]    OTHER SURGICAL HISTORY  02/10/2014    Type Of Stress Test    PROSTATE SURGERY      REFRACTIVE SURGERY  02/10/2014    Corneal LASIK    ROTATOR CUFF REPAIR  02/10/2014    Rotator Cuff Repair    TOTAL KNEE ARTHROPLASTY  02/10/2014    Total Knee Arthroplasty       Family History:  No family history on file.    Allergies:  No Known Allergies    Meds (Current):  Current Medications[1]      Pain Assessment:  Pain is: 0     Performance Status (ECOG):1  ECOG Definition  0 Fully active; no performance restrictions.  1 Strenuous physical activity restricted; fully ambulatory and able to carry out light work.  2 Capable of all self-care but unable to carry out any work activities. Up and about >50% of waking hours.  3 Capable of only limited self-care; confined to bed or chair >50% of waking hours.  4 Completely disabled; cannot carry out any self-care; totally confined to bed or chair.    Vital Signs  /62 (BP Location: Left arm, Patient Position: Sitting, BP Cuff Size: Adult)   Pulse 78   Temp 36.2 °C (97.2 °F) (Temporal)   Resp 18   Wt 71.5 kg (157 lb 8.3 oz)   SpO2 100%   BMI 23.46 kg/m²     Wt Readings from Last 5 Encounters:   25 71.5 kg (157 lb 8.3 oz)   05/15/25 72.9 kg (160 lb 12.8 oz)   25 72.9 kg (160 lb 11.5 oz)   25 73 kg (161 lb)   25 73.6 kg (162 lb 3.2 oz)       Physical Exam:  ECO  Pain: 0  Constitutional: Well developed, awake/alert/oriented x3, no distress, alert and cooperative  Eyes: PER. sclera anicteric  ENMT: Oral mucosa moist, no lesions or thrush identified  Respiratory/Thorax: Breathing is non-labored. Lungs are clear to auscultation bilaterally. No adventitious  breath sounds  Cardiovascular: S1-S2. Regular rate and rhythm. No murmurs, rubs, or gallops appreciated  Gastrointestinal: Abdomen soft nontender, nondistended, normal active bowel sounds.  Musculoskeletal: ROM intact, no joint swelling, normal strength  Extremities: normal extremities, no cyanosis, no edema, no clubbing  Lymphatics:  no palpable lymphadenopathy.   Skin: no rash  Neurologic: Nonfocal exam. No myoclonus  Psychological: Pleasant, appropriate and easily engaged         Labs:  3/3/2017  Creat 1.9 eGFR 38    Lab Results   Component Value Date    WBC 11.4 (H) 05/05/2025    HGB 10.7 (L) 05/05/2025    HCT 33.2 (L) 05/05/2025    MCV 97.9 05/05/2025     05/05/2025      Lab Results   Component Value Date    NEUTROABS 7.78 (H) 04/04/2024      Lab Results   Component Value Date    GLUCOSE 201 (H) 04/08/2025    CALCIUM 9.3 04/08/2025     04/08/2025    K 5.6 (H) 04/08/2025    CO2 22 04/08/2025     04/08/2025    BUN 37 (H) 04/08/2025    CREATININE 1.86 (H) 04/08/2025    MG 2.1 05/04/2021     Lab Results   Component Value Date    ALT 37 04/08/2025    AST 26 04/08/2025    ALKPHOS 98 04/08/2025    BILITOT 0.5 04/08/2025 4/8/2025  Iron 57, TIBC 356, %sat 16       Assessment/Plan:   75 y.o. male with past medical history as stated referred for management of lung cancer    The patient's records and imaging have been reviewed in detail.  I have discussed with the patient the natural history of their disease at length.  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 completed 11/2018. ADRIAN.     - Interval Imaging: CT  5/1/2025 chest (-) 1. Postoperative changes, as above. 2. Unchanged appearance of dense consolidation in the medial aspect of the right lung, most consistent in appearance with radiation fibrosis. 3. Focal airspace consolidation at  the lateral right lung base, may represent developing pneumonia. Clinical correlation and continued follow-up until clearing is recommended. 4. No definite new or enlarging pulmonary nodules or lymphadenopathy identified.    # Clinical Trials:  None currently.     # Access: Peripheral veins.    # Pain: No acute pain.    # Bone Health: No signs of bony disease.     # CKD: follows with nephrology    # Psychosocial: Coping well, no acute issues.  Good support from family & friends.  Social work support offered.    # GI/CINV: No acute issues.  Eating and voiding well.  Nutrition consult offered.    # Smoking: N/A, current smoker. No intention to quit. No interest in nicotine patch or gums. Smoking cessation counseling provided.    # Dispo: RTC in 1 year with CT chest      7/7/2025 Anemia. Likely in the setting of CKD. Procrit maybe considered in the future; however, hemoglobin has remained above 10 since 2018. We will repeat CBC, CMP. Repeat iron panel and ferritin as he has been on oral iron for a month, likely not needed. Repeat B12 level, B12 insufficiency noted in 5/2021. Although currently with normocytic anemia. Will draw reticulocyte count and erythropoietin level for baseline, may consider Procrit when appropriate. Myeloma markers in setting of anemia/CKD as I do not see they have been completed.   FUV in 2-3 weeks with Tawnya Callahan in hematology clinic.     Matthew Pedroza, DOMINIQUE-CNP  [facility]  Thoracic + [facility]&N Medical Oncology     I spent a total of 40+ minutes on the date of the service which included preparing to see the patient, face-to-face patient care, completing clinical documentation, obtaining and / or reviewing separately obtained history, counseling and educating the patient/family/caregiver, ordering medications, tests, or procedures, communicating with other healthcare providers (not separately reported), independently interpreting results, not separately reported, and communicating  results to the patient/family/caregiver, Name and date of birth verified.                      [1]   Current Outpatient Medications:     aspirin 81 mg EC tablet, Take 1 tablet (81 mg) by mouth once daily., Disp: , Rfl:     atorvastatin (Lipitor) 40 mg tablet, TAKE 1 TABLET(40 MG) BY MOUTH DAILY, Disp: 90 tablet, Rfl: 1    cholecalciferol (Vitamin D3) 50 mcg (2,000 units) capsule, Take 1 capsule (50 mcg) by mouth once daily., Disp: , Rfl:     clopidogrel (Plavix) 75 mg tablet, TAKE 1 TABLET(75 MG) BY MOUTH DAILY, Disp: 90 tablet, Rfl: 1    collagenase (SantyL) 250 unit/gram ointment, Apply a nickel thick amount topically to affected area once daily, Disp: 30 g, Rfl: 1    dorzolamide (Trusopt) 2 % ophthalmic solution, 1 drop 2 times a day., Disp: , Rfl:     gabapentin (Neurontin) 600 mg tablet, Take 1 tablet (600 mg) by mouth 2 times a day., Disp: 180 tablet, Rfl: 3    latanoprost (Xalatan) 0.005 % ophthalmic solution, Administer 1 drop into both eyes once daily at bedtime., Disp: , Rfl:     levETIRAcetam (Keppra) 250 mg tablet, Take 1 tablet (250 mg) by mouth 2 times a day., Disp: 180 tablet, Rfl: 3    levothyroxine (Synthroid, Levoxyl) 88 mcg tablet, Take 1 tablet (88 mcg) by mouth early in the morning.. Take on an empty stomach at the same time each day, either 30 to 60 minutes prior to breakfast, Disp: 90 tablet, Rfl: 0    linaGLIPtin (Tradjenta) 5 mg tablet, Take 1 tablet (5 mg) by mouth once daily., Disp: 90 tablet, Rfl: 3    lisinopril 5 mg tablet, Take 1 tablet (5 mg) by mouth once daily., Disp: 90 tablet, Rfl: 1    predniSONE (Deltasone) 5 mg tablet, Take 1 tablet (5 mg) by mouth once daily., Disp: 90 tablet, Rfl: 0    sodium bicarbonate 650 mg tablet, Take 2 tablets (1,300 mg) by mouth every 12 hours., Disp: , Rfl:     ferrous sulfate 325 mg (65 mg elemental) tablet, Take 1 tablet by mouth once daily with breakfast., Disp: , Rfl:

## 2025-07-07 NOTE — PROGRESS NOTES
Patient here for follow up visit Anemia per nephrology Squamous cell RUL Current surveillance. CT CAP 5/1.   He is taking iron at this time. Toes dont move on right foot. Feels like walking on rocks in both feet on the balls of the feet for some time.     Having further labs completed at [facility] this morning as well.     Denies N/V/D/C    Diabetic ulcer left knee followed by wound team appears resolved today.    No concerns or complaints noted at this time.   Patient here     Medications and Allergies reviewed and reconciled this visit.    Follow up per MD request. Follow up visit with Tawnya ABRAHAM in 2-3 weeks.     Patient reports availability and use of mychart, Reviewed this is a good place to communicate with the team as well as review labs and upcoming orders.      No barriers to education noted, patient agrees to current plan and verbalized understanding using teach back method.

## 2025-07-08 ENCOUNTER — LAB (OUTPATIENT)
Dept: LAB | Facility: HOSPITAL | Age: 75
End: 2025-07-08
Payer: MEDICARE

## 2025-07-08 DIAGNOSIS — D64.9 ANEMIA, UNSPECIFIED: Primary | ICD-10-CM

## 2025-07-08 DIAGNOSIS — D50.8 OTHER IRON DEFICIENCY ANEMIAS: ICD-10-CM

## 2025-07-08 DIAGNOSIS — E53.8 DEFICIENCY OF OTHER SPECIFIED B GROUP VITAMINS: ICD-10-CM

## 2025-07-08 DIAGNOSIS — N18.32 CHRONIC KIDNEY DISEASE, STAGE 3B (MULTI): ICD-10-CM

## 2025-07-08 LAB
ALBUMIN SERPL BCP-MCNC: 3.7 G/DL (ref 3.4–5)
ALP SERPL-CCNC: 91 U/L (ref 33–136)
ALT SERPL W P-5'-P-CCNC: 11 U/L (ref 10–52)
ANION GAP SERPL CALC-SCNC: 14 MMOL/L (ref 10–20)
AST SERPL W P-5'-P-CCNC: 12 U/L (ref 9–39)
BASOPHILS # BLD AUTO: 0.04 X10*3/UL (ref 0–0.1)
BASOPHILS NFR BLD AUTO: 0.4 %
BILIRUB SERPL-MCNC: 0.5 MG/DL (ref 0–1.2)
BUN SERPL-MCNC: 27 MG/DL (ref 6–23)
CALCIUM SERPL-MCNC: 9.2 MG/DL (ref 8.6–10.3)
CHLORIDE SERPL-SCNC: 105 MMOL/L (ref 98–107)
CO2 SERPL-SCNC: 24 MMOL/L (ref 21–32)
CREAT SERPL-MCNC: 1.75 MG/DL (ref 0.5–1.3)
EGFRCR SERPLBLD CKD-EPI 2021: 40 ML/MIN/1.73M*2
EOSINOPHIL # BLD AUTO: 0.07 X10*3/UL (ref 0–0.4)
EOSINOPHIL NFR BLD AUTO: 0.7 %
ERYTHROCYTE [DISTWIDTH] IN BLOOD BY AUTOMATED COUNT: 14.1 % (ref 11.5–14.5)
FERRITIN SERPL-MCNC: 73 NG/ML (ref 20–300)
GLUCOSE SERPL-MCNC: 148 MG/DL (ref 74–99)
HCT VFR BLD AUTO: 33.2 % (ref 41–52)
HGB BLD-MCNC: 10.5 G/DL (ref 13.5–17.5)
HGB RETIC QN: 36 PG (ref 28–38)
IGA SERPL-MCNC: 277 MG/DL (ref 70–400)
IGG SERPL-MCNC: 644 MG/DL (ref 700–1600)
IGM SERPL-MCNC: 134 MG/DL (ref 40–230)
IMM GRANULOCYTES # BLD AUTO: 0.06 X10*3/UL (ref 0–0.5)
IMM GRANULOCYTES NFR BLD AUTO: 0.6 % (ref 0–0.9)
IMMATURE RETIC FRACTION: 13.7 %
IRON SATN MFR SERPL: 22 % (ref 25–45)
IRON SERPL-MCNC: 80 UG/DL (ref 35–150)
LYMPHOCYTES # BLD AUTO: 0.5 X10*3/UL (ref 0.8–3)
LYMPHOCYTES NFR BLD AUTO: 4.7 %
MCH RBC QN AUTO: 31.9 PG (ref 26–34)
MCHC RBC AUTO-ENTMCNC: 31.6 G/DL (ref 32–36)
MCV RBC AUTO: 101 FL (ref 80–100)
MONOCYTES # BLD AUTO: 0.53 X10*3/UL (ref 0.05–0.8)
MONOCYTES NFR BLD AUTO: 5 %
NEUTROPHILS # BLD AUTO: 9.45 X10*3/UL (ref 1.6–5.5)
NEUTROPHILS NFR BLD AUTO: 88.6 %
NRBC BLD-RTO: 0 /100 WBCS (ref 0–0)
PLATELET # BLD AUTO: 282 X10*3/UL (ref 150–450)
POTASSIUM SERPL-SCNC: 4.1 MMOL/L (ref 3.5–5.3)
PROT SERPL-MCNC: 6.2 G/DL (ref 6.4–8.2)
RBC # BLD AUTO: 3.29 X10*6/UL (ref 4.5–5.9)
RETICS #: 0.08 X10*6/UL (ref 0.02–0.11)
RETICS/RBC NFR AUTO: 2.5 % (ref 0.5–2)
SODIUM SERPL-SCNC: 139 MMOL/L (ref 136–145)
TIBC SERPL-MCNC: 358 UG/DL (ref 240–445)
UIBC SERPL-MCNC: 278 UG/DL (ref 110–370)
VIT B12 SERPL-MCNC: 166 PG/ML (ref 211–911)
WBC # BLD AUTO: 10.7 X10*3/UL (ref 4.4–11.3)

## 2025-07-08 PROCEDURE — 83521 IG LIGHT CHAINS FREE EACH: CPT

## 2025-07-08 PROCEDURE — 82607 VITAMIN B-12: CPT

## 2025-07-08 PROCEDURE — 83550 IRON BINDING TEST: CPT

## 2025-07-08 PROCEDURE — 85045 AUTOMATED RETICULOCYTE COUNT: CPT

## 2025-07-08 PROCEDURE — 85025 COMPLETE CBC W/AUTO DIFF WBC: CPT

## 2025-07-08 PROCEDURE — 83540 ASSAY OF IRON: CPT

## 2025-07-08 PROCEDURE — 82728 ASSAY OF FERRITIN: CPT

## 2025-07-08 PROCEDURE — 80053 COMPREHEN METABOLIC PANEL: CPT

## 2025-07-08 PROCEDURE — 82784 ASSAY IGA/IGD/IGG/IGM EACH: CPT

## 2025-07-08 PROCEDURE — 36415 COLL VENOUS BLD VENIPUNCTURE: CPT

## 2025-07-08 PROCEDURE — 82668 ASSAY OF ERYTHROPOIETIN: CPT

## 2025-07-09 ENCOUNTER — TELEPHONE (OUTPATIENT)
Dept: HEMATOLOGY/ONCOLOGY | Facility: CLINIC | Age: 75
End: 2025-07-09
Payer: MEDICARE

## 2025-07-09 LAB
EST. AVERAGE GLUCOSE BLD GHB EST-MCNC: 128 MG/DL
EST. AVERAGE GLUCOSE BLD GHB EST-SCNC: 7.1 MMOL/L
HBA1C MFR BLD: 6.1 %
KAPPA LC SERPL-MCNC: 4.46 MG/DL (ref 0.33–1.94)
KAPPA LC/LAMBDA SER: 1.25 {RATIO} (ref 0.26–1.65)
LAMBDA LC SERPL-MCNC: 3.58 MG/DL (ref 0.57–2.63)

## 2025-07-09 NOTE — TELEPHONE ENCOUNTER
Spoke to patient. Vitamin B12 level found to be 166. Patient did see results and has started on vitamin B12 1000mcg daily   Follow up with Tawnya as planned

## 2025-07-10 LAB — EPO SERPL-ACNC: 13 MU/ML (ref 4–27)

## 2025-07-11 ENCOUNTER — OFFICE VISIT (OUTPATIENT)
Dept: RHEUMATOLOGY | Facility: CLINIC | Age: 75
End: 2025-07-11
Payer: MEDICARE

## 2025-07-11 VITALS — DIASTOLIC BLOOD PRESSURE: 60 MMHG | WEIGHT: 158.7 LBS | BODY MASS INDEX: 23.64 KG/M2 | SYSTOLIC BLOOD PRESSURE: 136 MMHG

## 2025-07-11 DIAGNOSIS — M81.0 OSTEOPOROSIS, SENILE: ICD-10-CM

## 2025-07-11 DIAGNOSIS — M06.9 RHEUMATOID ARTHRITIS INVOLVING MULTIPLE SITES, UNSPECIFIED WHETHER RHEUMATOID FACTOR PRESENT (MULTI): Primary | ICD-10-CM

## 2025-07-11 DIAGNOSIS — M15.9 OSTEOARTHRITIS OF MULTIPLE JOINTS, UNSPECIFIED OSTEOARTHRITIS TYPE: ICD-10-CM

## 2025-07-11 PROCEDURE — 3075F SYST BP GE 130 - 139MM HG: CPT | Performed by: INTERNAL MEDICINE

## 2025-07-11 PROCEDURE — 1159F MED LIST DOCD IN RCRD: CPT | Performed by: INTERNAL MEDICINE

## 2025-07-11 PROCEDURE — 99214 OFFICE O/P EST MOD 30 MIN: CPT | Performed by: INTERNAL MEDICINE

## 2025-07-11 PROCEDURE — 3078F DIAST BP <80 MM HG: CPT | Performed by: INTERNAL MEDICINE

## 2025-07-11 NOTE — PROGRESS NOTES
Recheck  OA  /  RA  /  OP  ( Refused )  Doing well overall with exception of occasional wrist swelling.     He sometimes wakes up with wrist swelling and pain and has difficulty closing hand that resolves within the day 2-3x/mo.  No other pain/swelling/stiffness.  Tyelnol helps a little.  No other pain/swelling.  No CP, resp, or GI.      PE  NAD  RRR II/VI syst murmur  CTA  1+ BLE edema  No synovitis  NT and no pain with ROM thoughout    A/P - OA and RA, stable on low-dose pred and refused DMARDs  OP - refused repeat DEXA after drug holiday  Reviewed recent CMP and CBC - CRF and anemia  Follow up 6 mo or sooner PRN

## 2025-07-21 ENCOUNTER — OFFICE VISIT (OUTPATIENT)
Dept: HEMATOLOGY/ONCOLOGY | Facility: CLINIC | Age: 75
End: 2025-07-21
Payer: MEDICARE

## 2025-07-21 VITALS
RESPIRATION RATE: 18 BRPM | BODY MASS INDEX: 23.63 KG/M2 | OXYGEN SATURATION: 100 % | HEART RATE: 78 BPM | TEMPERATURE: 97 F | WEIGHT: 158.62 LBS | DIASTOLIC BLOOD PRESSURE: 73 MMHG | SYSTOLIC BLOOD PRESSURE: 170 MMHG

## 2025-07-21 DIAGNOSIS — N18.32 STAGE 3B CHRONIC KIDNEY DISEASE (MULTI): ICD-10-CM

## 2025-07-21 DIAGNOSIS — J42 CHRONIC BRONCHITIS, UNSPECIFIED CHRONIC BRONCHITIS TYPE (MULTI): ICD-10-CM

## 2025-07-21 DIAGNOSIS — D64.9 ANEMIA, UNSPECIFIED TYPE: ICD-10-CM

## 2025-07-21 PROCEDURE — 99215 OFFICE O/P EST HI 40 MIN: CPT | Performed by: NURSE PRACTITIONER

## 2025-07-21 PROCEDURE — 3078F DIAST BP <80 MM HG: CPT | Performed by: NURSE PRACTITIONER

## 2025-07-21 PROCEDURE — 3077F SYST BP >= 140 MM HG: CPT | Performed by: NURSE PRACTITIONER

## 2025-07-21 PROCEDURE — 1159F MED LIST DOCD IN RCRD: CPT | Performed by: NURSE PRACTITIONER

## 2025-07-21 PROCEDURE — 1126F AMNT PAIN NOTED NONE PRSNT: CPT | Performed by: NURSE PRACTITIONER

## 2025-07-21 ASSESSMENT — ENCOUNTER SYMPTOMS
UNEXPECTED WEIGHT CHANGE: 1
APPETITE CHANGE: 0
PALPITATIONS: 0
MYALGIAS: 1
HEADACHES: 0
BLOOD IN STOOL: 0
EXTREMITY WEAKNESS: 0
GASTROINTESTINAL NEGATIVE: 1
BACK PAIN: 1
RESPIRATORY NEGATIVE: 1
ABDOMINAL PAIN: 0
BRUISES/BLEEDS EASILY: 1
LEG SWELLING: 0
CONSTIPATION: 0
SORE THROAT: 1
COUGH: 0
WOUND: 0
CARDIOVASCULAR NEGATIVE: 1
TROUBLE SWALLOWING: 0
FATIGUE: 1
ARTHRALGIAS: 1
CHEST TIGHTNESS: 0
EYES NEGATIVE: 1
ADENOPATHY: 0
ABDOMINAL DISTENTION: 0
HEMOPTYSIS: 0
SHORTNESS OF BREATH: 0
DIAPHORESIS: 0
CHILLS: 0
LIGHT-HEADEDNESS: 0
NECK STIFFNESS: 0
FEVER: 0
DIZZINESS: 0

## 2025-07-21 ASSESSMENT — PAIN SCALES - GENERAL: PAINLEVEL_OUTOF10: 0-NO PAIN

## 2025-07-21 NOTE — PROGRESS NOTES
Patient ID: Jose Manuel Andujar is a 75 y.o. male.  Referring Physician: Matthew Pedroza, APRN-CNP  [address] [place] [address]  [address]  [place] [address]  Primary Care Provider: Cayla Kaminski PA-C  Visit Type: Follow Up      Subjective    HPI  Being asked to see patient for longstanding anemia present since 2018.   , current labs WBC 10.7, hgb 10.5 and plt 282 with decreased lymphocytes 0.5.  BUN/crt 27/1.15.   Ferritan 73, iron 809 % sat 22 with B12 of 166.  He started B12 supplement last week.  He has been taking oral iron. He has arthritis with elevated sed rate and is maintained on prednisone.  He is on Plavix and ASA and has bruising of UE.   He has not noted blood in stool or urine.  He was treated with Gemzar/Carboplatin for his lung cancer and was exposed to chemical while serving in [email or web address].   He continues to smoke.   He has decreased energy, but not overwhelming fatigue or malaise.      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 completed 12/2018.    Presenting History (05/09/2024): At time of initial presentation a 75 y.o. male, current smoker (started at age 15, 1 pack per day) with a past medical history of RA, BPH, HLD, HTN, DM2, GERD, PAD, hypothyroidism, referred for management of lung cancer     03/14/2018: RUL biopsy: fragments of necrotic tissue and partially hyalinized and inflamed fibrous connective tissue.  4Rmetaplastic squamous cells and rare bronchial epithelial cells.      06/25/2018: RUL lobectomy, tumor measures 5.5x4.x4cm, squamous cell carcinoma, moderately to poorly differentiated, VPI +, LVI indeterminate. 2 Peribronchial Lns were positive. 3 hilar Lns were negative.  4/20 LN positive total. [id number]          PMHx: RA, BPH, HLD, T2DM, GERD, HTN, PAD, hypothyroidism  PSHx: 6/14/2018 RUL lobectomy, cholecystectomy, knee replacements (MIKAELA), hammertoe (L foot), stent in left leg, MIKAELA inguinal hernia  repair, carotid artery (2018), UroLift (11/3/20)   FHx:  No family hx of cancer.   SHx:  Continues to smoke - 1PPD. started 1966 - 57 year hx, no illicit drug use     Treatment Summary:  Adjuvant carboplatin/Gemzar and RT completed 11/2018  current - yearly surveillance      Interval History (7/7/2025):    Patient is seen today for concern for anemia. He contacted the office in June after seeing his Nephrologist, Dr. Sims. Last CBC was completed in May hemoglobin was 10.7 and hematocrit 33.2. On chart review his anemia dates back to at least 2018 at which time he received adjuvant carboplatin + Gemzar and radiation therapy. Anemia predates chemotherapy. Unable to locate CBC prior to 1/2018. This is also in the setting of chronic kidney disease. DM managed by PCP, most recent hemoglobin A1C 8.5. Reports AM blood glucose around 100. Patient also with history of RA on daily prednisone      Most recent vitamin B12 level was completed in May 2021 and was found to be 289.  Iron studies completed in April 2025 Iron 57, TIBC 356, % sat 16. Ferritin not drawn, maybe falsely elevated due to RA. He has been taking oral iron for about 1 month as he recalls being told he was iron deficient in 2018. No labs available to confirm. Not currently iron deficient to cause current level of anemia. No s/s active bleeding.      Chronic kidney disease, stage 3b      PRBC transfusion in October 2018 x 2 units      We typically follow him for lung cancer next CT chest due in May 2026. Previous L knee infection healed, no longer following in wound clinic. He denies any cough. + ABBOTT but chronic. No hemoptysis. Eating voiding well. No n/v. No fever or chills. He continues to smoke 1 pack per day. Not interested in quitting.      Review of Systems   Constitutional:  Positive for fatigue and unexpected weight change. Negative for appetite change, chills, diaphoresis and fever.   HENT:   Positive for sore throat. Negative for hearing loss,  lump/mass, mouth sores, tinnitus and trouble swallowing.    Eyes: Negative.    Respiratory: Negative.  Negative for chest tightness, cough, hemoptysis and shortness of breath.    Cardiovascular: Negative.  Negative for chest pain, leg swelling and palpitations.   Gastrointestinal: Negative.  Negative for abdominal distention, abdominal pain, blood in stool and constipation.   Musculoskeletal:  Positive for arthralgias, back pain and myalgias. Negative for gait problem and neck stiffness.   Skin: Negative.  Negative for itching, rash and wound.   Neurological:  Negative for dizziness, extremity weakness, gait problem, headaches and light-headedness.   Hematological:  Negative for adenopathy. Bruises/bleeds easily.        Objective   BSA: 1.87 meters squared  /73 (BP Location: Left arm, Patient Position: Sitting, BP Cuff Size: Adult)   Pulse 78   Temp 36.1 °C (97 °F) (Temporal)   Resp 18   Wt 72 kg (158 lb 9.9 oz)   SpO2 100%   BMI 23.63 kg/m²      has a past medical history of Arthritis, Cancer (Multi), Cataract, Diabetes mellitus (Multi), Disease of thyroid gland, Encounter for screening for malignant neoplasm of prostate, Glaucoma, Hyperlipidemia, unspecified (12/08/2013), Hypertension, Lung cancer (Multi) (2018), Personal history of colonic polyps, and Stroke (Multi).   has a past surgical history that includes Cholecystectomy (02/10/2014); Rotator cuff repair (02/10/2014); Other surgical history (02/10/2014); Knee arthroscopy w/ debridement (02/10/2014); Refractive surgery (02/10/2014); Total knee arthroplasty (02/10/2014); Esophagogastroduodenoscopy (02/10/2014); Hernia repair; Joint replacement; Prostate surgery; MR angio head wo IV contrast (5/8/2018); CT guided imaging for needle placement (3/28/2018); and Lung surgery (2018).  Family History[1]  Oncology History   Primary squamous cell carcinoma of bronchus in right upper lobe   4/25/2018 Cancer Staged    Staging form: Lung, AJCC 8th Edition,  Pathologic stage from 4/25/2018: Stage IIIA (pT3, pN1, cM0) - Signed by Jennifer Mckeon MD MPH on 5/9/2024 5/9/2024 Initial Diagnosis    Primary squamous cell carcinoma of bronchus in right upper lobe (Multi)         Jose Manuel Andujar  reports that he has been smoking cigarettes. He started smoking about 60 years ago. He has a 60.6 pack-year smoking history. He has been exposed to tobacco smoke. He has never used smokeless tobacco.  He  reports no history of alcohol use.  He  reports no history of drug use.    Physical Exam  Constitutional:       Comments: Appears thin, multiple areas of bruising UE on Plavix and ASA     Eyes:      Conjunctiva/sclera: Conjunctivae normal.      Pupils: Pupils are equal, round, and reactive to light.       Cardiovascular:      Rate and Rhythm: Normal rate and regular rhythm.      Pulses: Normal pulses.      Heart sounds: Normal heart sounds. No murmur heard.     Comments: BP elevated  Pulmonary:      Effort: No respiratory distress.      Breath sounds: Normal breath sounds. No stridor. No wheezing, rhonchi or rales.   Abdominal:      General: There is no distension.      Palpations: There is no mass.      Tenderness: There is no abdominal tenderness.     Musculoskeletal:         General: No swelling, tenderness or deformity. Normal range of motion.   Lymphadenopathy:      Cervical: No cervical adenopathy.     Skin:     Coloration: Skin is not jaundiced or pale.      Findings: Bruising present. No erythema.     Neurological:      Motor: No weakness.       WBC   Date/Time Value Ref Range Status   07/08/2025 07:18 AM 10.7 4.4 - 11.3 x10*3/uL Final   04/04/2024 09:47 AM 9.1 4.4 - 11.3 x10*3/uL Final   03/04/2022 10:29 AM 12.0 (H) 4.5 - 11.0 K/UL Final   11/03/2021 09:26 AM 10.3 4.5 - 11.0 K/UL Final   07/06/2021 08:14 AM 14.1 (H) 4.5 - 11.0 K/UL Final     WHITE BLOOD CELL COUNT   Date/Time Value Ref Range Status   05/05/2025 09:54 AM 11.4 (H) 3.8 - 10.8 Thousand/uL Final   04/08/2025 08:42 AM  11.3 (H) 3.8 - 10.8 Thousand/uL Final     nRBC   Date Value Ref Range Status   07/08/2025 0.0 0.0 - 0.0 /100 WBCs Final   04/04/2024 0.0 0.0 - 0.0 /100 WBCs Final   03/04/2022 0 0 /100 WBC Final     Comment:     Performed at [address] [address]   11/03/2021 0 0 /100 WBC Final     Comment:     Performed at [address] [address]   07/06/2021 0 0 /100 WBC Final     Comment:     Performed at [address] [address]     RBC   Date Value Ref Range Status   07/08/2025 3.29 (L) 4.50 - 5.90 x10*6/uL Final   04/04/2024 4.15 (L) 4.50 - 5.90 x10*6/uL Final   03/04/2022 3.76 (L) 4.5 - 5.5 M/UL Final   11/03/2021 3.93 (L) 4.5 - 5.5 M/UL Final   07/06/2021 3.98 (L) 4.5 - 5.5 M/UL Final     RED BLOOD CELL COUNT   Date Value Ref Range Status   05/05/2025 3.39 (L) 4.20 - 5.80 Million/uL Final   04/08/2025 3.46 (L) 4.20 - 5.80 Million/uL Final     Hemoglobin   Date Value Ref Range Status   07/08/2025 10.5 (L) 13.5 - 17.5 g/dL Final   04/04/2024 13.3 (L) 13.5 - 17.5 g/dL Final   03/04/2022 12.3 (L) 13.5 - 16.5 GM/DL Final   11/03/2021 12.7 (L) 13.5 - 16.5 GM/DL Final   07/06/2021 12.9 (L) 13.5 - 16.5 GM/DL Final     HEMOGLOBIN   Date Value Ref Range Status   05/05/2025 10.7 (L) 13.2 - 17.1 g/dL Final   04/08/2025 10.9 (L) 13.2 - 17.1 g/dL Final     Hematocrit   Date Value Ref Range Status   07/08/2025 33.2 (L) 41.0 - 52.0 % Final   04/04/2024 38.9 (L) 41.0 - 52.0 % Final   03/04/2022 38.4 (L) 41 - 50 % Final   11/03/2021 37.8 (L) 41 - 50 % Final   07/06/2021 39.7 (L) 41 - 50 % Final     HEMATOCRIT   Date Value Ref Range Status   05/05/2025 33.2 (L) 38.5 - 50.0 % Final   04/08/2025 33.8 (L) 38.5 - 50.0 % Final     MCV   Date/Time Value Ref Range Status   07/08/2025 07:18  (H) 80 - 100 fL Final   05/05/2025 09:54 AM 97.9 80.0 - 100.0 fL Final   04/08/2025 08:42 AM 97.7 80.0 - 100.0 fL Final   04/04/2024 09:47 AM 94 80 - 100 fL Final   03/04/2022 10:29 AM  102.1 (H) 80 - 100 FL Final   11/03/2021 09:26 AM 96.2 80 - 100 FL Final   07/06/2021 08:14 AM 99.7 80 - 100 FL Final     MCH   Date/Time Value Ref Range Status   07/08/2025 07:18 AM 31.9 26.0 - 34.0 pg Final   05/05/2025 09:54 AM 31.6 27.0 - 33.0 pg Final   04/08/2025 08:42 AM 31.5 27.0 - 33.0 pg Final   04/04/2024 09:47 AM 32.0 26.0 - 34.0 pg Final   03/04/2022 10:29 AM 32.7 26 - 34 PG Final     MCHC   Date/Time Value Ref Range Status   07/08/2025 07:18 AM 31.6 (L) 32.0 - 36.0 g/dL Final   05/05/2025 09:54 AM 32.2 32.0 - 36.0 g/dL Final     Comment:     For adults, a slight decrease in the calculated MCHC  value (in the range of 30 to 32 g/dL) is most likely  not clinically significant; however, it should be  interpreted with caution in correlation with other  red cell parameters and the patient's clinical  condition.     04/08/2025 08:42 AM 32.2 32.0 - 36.0 g/dL Final     Comment:     For adults, a slight decrease in the calculated MCHC  value (in the range of 30 to 32 g/dL) is most likely  not clinically significant; however, it should be  interpreted with caution in correlation with other  red cell parameters and the patient's clinical  condition.     04/04/2024 09:47 AM 34.2 32.0 - 36.0 g/dL Final   03/04/2022 10:29 AM 32.0 31 - 37 % Final   11/03/2021 09:26 AM 33.6 31 - 37 % Final   07/06/2021 08:14 AM 32.5 31 - 37 % Final     RDW   Date/Time Value Ref Range Status   07/08/2025 07:18 AM 14.1 11.5 - 14.5 % Final   05/05/2025 09:54 AM 12.7 11.0 - 15.0 % Final   04/08/2025 08:42 AM 12.3 11.0 - 15.0 % Final   04/04/2024 09:47 AM 12.6 11.5 - 14.5 % Final   02/26/2020 12:07 PM 14.3 11.5 - 14.5 % Final   09/13/2018 12:00 AM 18.0 (H) 11.5 - 14.5 % Final   05/15/2018 12:00 AM 15.2 (H) 11.5 - 14.5 % Final     Platelets   Date/Time Value Ref Range Status   07/08/2025 07:18  150 - 450 x10*3/uL Final   04/04/2024 09:47  150 - 450 x10*3/uL Final   03/04/2022 10:29  150 - 450 K/UL Final   11/03/2021 09:26   150 - 450 K/UL Final   07/06/2021 08:14  150 - 450 K/UL Final     PLATELET COUNT   Date/Time Value Ref Range Status   05/05/2025 09:54  140 - 400 Thousand/uL Final   04/08/2025 08:42  (H) 140 - 400 Thousand/uL Final     MPV   Date/Time Value Ref Range Status   05/05/2025 09:54 AM 9.4 7.5 - 12.5 fL Final   04/08/2025 08:42 AM 9.0 7.5 - 12.5 fL Final   03/04/2022 10:29 AM 9.5 7.0 - 12.6 CU Final   11/03/2021 09:26 AM 9.6 7.0 - 12.6 CU Final   07/06/2021 08:14 AM 9.4 7.0 - 12.6 CU Final     Neutrophils %   Date/Time Value Ref Range Status   07/08/2025 07:18 AM 88.6 40.0 - 80.0 % Final   04/04/2024 09:47 AM 85.9 40.0 - 80.0 % Final   02/26/2020 12:07 PM 85.6 40.0 - 80.0 % Final     Immature Granulocytes %, Automated   Date/Time Value Ref Range Status   07/08/2025 07:18 AM 0.6 0.0 - 0.9 % Final     Comment:     Immature Granulocyte Count (IG) includes promyelocytes, myelocytes and metamyelocytes but does not include bands. Percent differential counts (%) should be interpreted in the context of the absolute cell counts (cells/UL).   04/04/2024 09:47 AM 0.4 0.0 - 0.9 % Final     Comment:     Immature Granulocyte Count (IG) includes promyelocytes, myelocytes and metamyelocytes but does not include bands. Percent differential counts (%) should be interpreted in the context of the absolute cell counts (cells/UL).   02/26/2020 12:07 PM 0.3 0.0 - 0.9 % Final     Comment:      Immature Granulocyte Count (IG) includes promyelocytes,    myelocytes and metamyelocytes but does not include bands.   Percent differential counts (%) should be interpreted in the   context of the absolute cell counts (cells/L).       Lymphocytes %   Date/Time Value Ref Range Status   07/08/2025 07:18 AM 4.7 13.0 - 44.0 % Final   04/04/2024 09:47 AM 7.3 13.0 - 44.0 % Final   02/26/2020 12:07 PM 7.7 13.0 - 44.0 % Final   12/27/2018 01:20 PM 4.60 (L) 20 - 40 % Final   11/27/2018 01:07 PM 6.70 (L) 20 - 40 % Final   10/30/2018 09:56 AM  6.00 (L) 20 - 40 % Final     LYMPHOCYTES   Date/Time Value Ref Range Status   05/05/2025 09:54 AM 6.7 % Final   04/08/2025 08:42 AM 7.7 % Final     Monocytes %   Date/Time Value Ref Range Status   07/08/2025 07:18 AM 5.0 2.0 - 10.0 % Final   04/04/2024 09:47 AM 5.4 2.0 - 10.0 % Final   02/26/2020 12:07 PM 5.4 2.0 - 10.0 % Final   12/27/2018 01:20 PM 5.60 0 - 8 % Final   11/27/2018 01:07 PM 5.20 0 - 8 % Final   10/30/2018 09:56 AM 4.90 0 - 8 % Final     MONOCYTES   Date/Time Value Ref Range Status   05/05/2025 09:54 AM 4.9 % Final   04/08/2025 08:42 AM 3.9 % Final     Eosinophils %   Date/Time Value Ref Range Status   07/08/2025 07:18 AM 0.7 0.0 - 6.0 % Final   04/04/2024 09:47 AM 0.6 0.0 - 6.0 % Final   02/26/2020 12:07 PM 0.6 0.0 - 6.0 % Final     EOSINOPHILS   Date/Time Value Ref Range Status   05/05/2025 09:54 AM 0.3 % Final   04/08/2025 08:42 AM 0.6 % Final     Basophils %   Date/Time Value Ref Range Status   07/08/2025 07:18 AM 0.4 0.0 - 2.0 % Final   04/04/2024 09:47 AM 0.4 0.0 - 2.0 % Final   02/26/2020 12:07 PM 0.4 0.0 - 2.0 % Final   12/27/2018 01:20 PM 0.40 0 - 1 % Final   11/27/2018 01:07 PM 0.40 0 - 1 % Final     BASOPHILS   Date/Time Value Ref Range Status   05/05/2025 09:54 AM 0.4 % Final   04/08/2025 08:42 AM 0.6 % Final     Neutrophils Absolute   Date/Time Value Ref Range Status   07/08/2025 07:18 AM 9.45 (H) 1.60 - 5.50 x10*3/uL Final     Comment:     Percent differential counts (%) should be interpreted in the context of the absolute cell counts (cells/uL).   04/04/2024 09:47 AM 7.78 (H) 1.60 - 5.50 x10*3/uL Final     Comment:     Percent differential counts (%) should be interpreted in the context of the absolute cell counts (cells/uL).   02/26/2020 12:07 PM 8.18 (H) 1.60 - 5.50 x10E9/L Final   12/27/2018 01:20 PM 8.92 (H) 1.8 - 7.7 K/UL Final   11/27/2018 01:07 PM 10.47 (H) 1.8 - 7.7 K/UL Final     Immature Granulocytes Absolute, Automated   Date/Time Value Ref Range Status   07/08/2025 07:18 AM  0.06 0.00 - 0.50 x10*3/uL Final   04/04/2024 09:47 AM 0.04 0.00 - 0.50 x10*3/uL Final   12/27/2018 01:20 PM 0.06 0.0 - 0.1 K/UL Final   11/27/2018 01:07 PM 0.07 0.0 - 0.1 K/UL Final   10/30/2018 09:56 AM 0.12 (H) 0.0 - 0.1 K/UL Final     Lymphocytes Absolute   Date/Time Value Ref Range Status   07/08/2025 07:18 AM 0.50 (L) 0.80 - 3.00 x10*3/uL Final   04/04/2024 09:47 AM 0.66 (L) 0.80 - 3.00 x10*3/uL Final   02/26/2020 12:07 PM 0.74 (L) 0.80 - 3.00 x10E9/L Final   12/27/2018 01:20 PM 0.47 (L) 1.2 - 3.2 K/UL Final   11/27/2018 01:07 PM 0.82 (L) 1.2 - 3.2 K/UL Final     Monocytes Absolute   Date/Time Value Ref Range Status   07/08/2025 07:18 AM 0.53 0.05 - 0.80 x10*3/uL Final   04/04/2024 09:47 AM 0.49 0.05 - 0.80 x10*3/uL Final   02/26/2020 12:07 PM 0.52 0.05 - 0.80 x10E9/L Final   12/27/2018 01:20 PM 0.57 0 - 0.8 K/UL Final   11/27/2018 01:07 PM 0.63 0 - 0.8 K/UL Final     Eosinophils Absolute   Date/Time Value Ref Range Status   07/08/2025 07:18 AM 0.07 0.00 - 0.40 x10*3/uL Final   04/04/2024 09:47 AM 0.05 0.00 - 0.40 x10*3/uL Final   02/26/2020 12:07 PM 0.06 0.00 - 0.40 x10E9/L Final   12/27/2018 01:20 PM 0.05 0 - 0.45 K/UL Final   11/27/2018 01:07 PM 0.13 0 - 0.45 K/UL Final     ABSOLUTE EOSINOPHILS   Date/Time Value Ref Range Status   05/05/2025 09:54 AM 34 15 - 500 cells/uL Final   04/08/2025 08:42 AM 68 15 - 500 cells/uL Final     Basophils Absolute   Date/Time Value Ref Range Status   07/08/2025 07:18 AM 0.04 0.00 - 0.10 x10*3/uL Final   04/04/2024 09:47 AM 0.04 0.00 - 0.10 x10*3/uL Final   02/26/2020 12:07 PM 0.04 0.00 - 0.10 x10E9/L Final   12/27/2018 01:20 PM 0.04 0.00 - 0.22 K/UL Final     Comment:     Performed at [facility],[address],[place] [address]   11/27/2018 01:07 PM 0.05 0.00 - 0.22 K/UL Final     ABSOLUTE BASOPHILS   Date/Time Value Ref Range Status   05/05/2025 09:54 AM 46 0 - 200 cells/uL Final   04/08/2025 08:42 AM 68 0 - 200 cells/uL Final       No components found for:  "\"PT\"  aPTT   Date/Time Value Ref Range Status   06/07/2018 09:29 AM 26.4 22.0 - 32.5 SEC Final     Comment:     Performed at [address] [address]   05/02/2018 04:44 PM 24.1 22.0 - 32.5 SEC Final     Comment:     Performed at [place] [id number] [facility] [address]   03/14/2018 09:33 AM 24.0 22.0 - 32.5 SEC Final     Comment:     Performed at [address] [address]       Assessment/Plan    Multifactorial anemia present since 2018.  Hgb currently 10.5  Renal insufficiency BUN/crt 27/1.75  have discussed that we could potentially treat with Procrit if hgb fell to < 10 due to anemia of renal insufficiency  B12 deficiency of 166, started B12 last week, would not see benefit of treatment at this point in time, will recheck in 3 months.  Erythropoeitin level 13.   Anemia of chronic disease with arthritis currently maintained on prednisone  Chemical exposure during [place]  Previous treatment with Carboplatin and Gemzar, Gemzar being known to cause panctyopenia, but anemia did precede treatment for lung cancer  Lymphocytes are low, will check for EBV, CMV, hepatitis and HIV.   Will also do flow cytometry and myeloid panel as part of evaluation in 3 months.     At this time will give him 3 months on the B12 to see how his anemia corrects on that and recheck renal function to see if he qualifies for Procrit at that time.  He may warrant bone marrow biopsy in the future due to prior exposures and potential bone marrow dysfunction.       75 y.o. male with past medical history as stated referred for management of lung cancer     The patient's records and imaging have been reviewed in detail.  I have discussed with the patient the natural history of their disease at length.  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 " completed 11/2018. ADRIAN.                 - Interval Imaging: CT  5/1/2025 chest (-) 1. Postoperative changes, as above. 2. Unchanged appearance of dense consolidation in the medial aspect of the right lung, most consistent in appearance with radiation fibrosis. 3. Focal airspace consolidation at the lateral right lung base, may represent developing pneumonia. Clinical correlation and continued follow-up until clearing is recommended. 4. No definite new or enlarging pulmonary nodules or lymphadenopathy identified.     # CKD: follows with nephrology     # Smoking: N/A, current smoker. No intention to quit. No interest in nicotine patch or gums. Smoking cessation counseling provided.             7/7/2025 Anemia. Likely in the setting of CKD. Procrit maybe considered in the future; however, hemoglobin has remained above 10 since 2018. We will repeat CBC, CMP. Repeat iron panel and ferritin as he has been on oral iron for a month, likely not needed. Repeat B12 level, B12 insufficiency noted in 5/2021. Although currently with normocytic anemia. Will draw reticulocyte count and erythropoietin level for baseline, may consider Procrit when appropriate. Myeloma markers in setting of anemia/CKD as I do not see they have been completed.     Follow up in 3 months        Diagnoses and all orders for this visit:  Anemia, unspecified type  -     Clinic Appointment Request Follow up; WILLOW GALLAGHER  -     Clinic Appointment Request; Future  Stage 3b chronic kidney disease (Multi)  -     Clinic Appointment Request Follow up; WILLOW GALLAGHER PA-C                                [1] No family history on file.

## 2025-07-21 NOTE — PROGRESS NOTES
Patient here for  visit with Tawnya Callahan for anemia.  Patient here with his spouse.     Medications and Allergies reviewed and reconciled this visit.    No concerns or complaints noted at this time.     Pt reports fatigue.  Pt reports SOB upon exertion.   Pt reports appetite is good.     Labs reviewed. Pt reports that he is now taking Vitamin B12.   Follow up per PA request in 6 months.    Pt. reports availability and use of mychart, Reviewed this is a good place to communicate with the team as well as review labs and upcoming orders.     No barriers to education noted, patient agrees to current plan and verbalized understanding using teach back method.

## 2025-07-28 ENCOUNTER — APPOINTMENT (OUTPATIENT)
Dept: PRIMARY CARE | Facility: CLINIC | Age: 75
End: 2025-07-28
Payer: MEDICARE

## 2025-07-28 VITALS
HEART RATE: 73 BPM | BODY MASS INDEX: 23.49 KG/M2 | TEMPERATURE: 97.5 F | DIASTOLIC BLOOD PRESSURE: 72 MMHG | SYSTOLIC BLOOD PRESSURE: 136 MMHG | HEIGHT: 68 IN | WEIGHT: 155 LBS | OXYGEN SATURATION: 99 %

## 2025-07-28 DIAGNOSIS — E11.22 TYPE 2 DIABETES MELLITUS WITH STAGE 3B CHRONIC KIDNEY DISEASE, WITHOUT LONG-TERM CURRENT USE OF INSULIN (MULTI): Primary | ICD-10-CM

## 2025-07-28 DIAGNOSIS — I10 PRIMARY HYPERTENSION: ICD-10-CM

## 2025-07-28 DIAGNOSIS — N18.32 TYPE 2 DIABETES MELLITUS WITH STAGE 3B CHRONIC KIDNEY DISEASE, WITHOUT LONG-TERM CURRENT USE OF INSULIN (MULTI): Primary | ICD-10-CM

## 2025-07-28 PROBLEM — E87.22 CHRONIC METABOLIC ACIDOSIS: Status: ACTIVE | Noted: 2025-07-28

## 2025-07-28 PROBLEM — S16.1XXA STRAIN OF NECK MUSCLE: Status: ACTIVE | Noted: 2024-09-01

## 2025-07-28 PROBLEM — N28.1 ACQUIRED CYSTIC KIDNEY DISEASE: Status: ACTIVE | Noted: 2025-07-28

## 2025-07-28 PROCEDURE — 3078F DIAST BP <80 MM HG: CPT

## 2025-07-28 PROCEDURE — 1159F MED LIST DOCD IN RCRD: CPT

## 2025-07-28 PROCEDURE — RXMED WILLOW AMBULATORY MEDICATION CHARGE

## 2025-07-28 PROCEDURE — G2211 COMPLEX E/M VISIT ADD ON: HCPCS

## 2025-07-28 PROCEDURE — 99214 OFFICE O/P EST MOD 30 MIN: CPT

## 2025-07-28 PROCEDURE — 3075F SYST BP GE 130 - 139MM HG: CPT

## 2025-07-28 ASSESSMENT — PATIENT HEALTH QUESTIONNAIRE - PHQ9
2. FEELING DOWN, DEPRESSED OR HOPELESS: NOT AT ALL
1. LITTLE INTEREST OR PLEASURE IN DOING THINGS: NOT AT ALL
SUM OF ALL RESPONSES TO PHQ9 QUESTIONS 1 AND 2: 0

## 2025-07-28 ASSESSMENT — COLUMBIA-SUICIDE SEVERITY RATING SCALE - C-SSRS
1. IN THE PAST MONTH, HAVE YOU WISHED YOU WERE DEAD OR WISHED YOU COULD GO TO SLEEP AND NOT WAKE UP?: NO
2. HAVE YOU ACTUALLY HAD ANY THOUGHTS OF KILLING YOURSELF?: NO
6. HAVE YOU EVER DONE ANYTHING, STARTED TO DO ANYTHING, OR PREPARED TO DO ANYTHING TO END YOUR LIFE?: NO

## 2025-07-28 NOTE — ASSESSMENT & PLAN NOTE
Chronic. Continue Lisinopril 5mg daily. DASH diet and 30 minutes of exercise 5x/week. Check home BP and keep log. Recheck in 3 months, call office sooner for persistent readings >140/90.

## 2025-07-28 NOTE — PROGRESS NOTES
Subjective   Patient ID: Jose Manuel Andujar is a 75 y.o. male who presents for Diabetes.    HPI   DM: On Tradjenta 5mg daily. Tolerating well. FBS . Eye exam Dr. Nicholson at [facility]. Foot exam today. Denies chest pain, SOB, dizziness, lightheadedness, nausea, vomiting, hypoglycemic episodes.   Lab Results   Component Value Date    HGBA1C 6.1 (H) 07/08/2025    HGBA1C 8.5 (H) 04/08/2025    HGBA1C 6.3 (H) 10/09/2024     Lab Results   Component Value Date    CREATININE 1.75 (H) 07/08/2025    GLUCOSE 148 (H) 07/08/2025    EGFR 40 (L) 07/08/2025    GFRMALE 35 (A) 08/08/2022     BMI Readings from Last 6 Encounters:   07/28/25 23.57 kg/m²   07/21/25 23.63 kg/m²   07/11/25 23.64 kg/m²   07/07/25 23.46 kg/m²   05/15/25 23.95 kg/m²   05/08/25 23.94 kg/m²     ALBUMIN/CREATININE RATIO, RANDOM URINE   Date Value Ref Range Status   04/08/2025 109 (H) <30 mg/g creat Final     Comment:        The ADA defines abnormalities in albumin  excretion as follows:     Albuminuria Category        Result (mg/g creatinine)     Normal to Mildly increased   <30  Moderately increased            Severely increased           > OR = 300     The ADA recommends that at least two of three  specimens collected within a 3-6 month period be  abnormal before considering a patient to be  within a diagnostic category.       Albumin/Creatinine Ratio   Date Value Ref Range Status   07/18/2024 101.2 (H) <30.0 ug/mg Creat Final     Albumin/Creatine Ratio   Date Value Ref Range Status   09/13/2018 1,092.3 (H) 0.0 - 30.0 ug/mg crt Final     HTN/CKD: On Lisinopril 5mg daily. No medication side effects. Home 's/70's. Denies chest pain, heart palpitations, SOB, dizziness, headaches, changes of vision, syncope, leg swelling.     Review of Systems  All other systems have been reviewed and are negative except as noted in the HPI.     Objective   /72 (BP Location: Left arm, Patient Position: Sitting, BP Cuff Size: Adult)   Pulse 73   Temp 36.4 °C (97.5 °F)  "(Temporal)   Ht 1.727 m (5' 8\")   Wt 70.3 kg (155 lb)   SpO2 99%   BMI 23.57 kg/m²     Physical Exam  Vitals and nursing note reviewed.   Constitutional:       General: He is not in acute distress.    Eyes:      Extraocular Movements: Extraocular movements intact.      Conjunctiva/sclera: Conjunctivae normal.     Neck:      Vascular: No carotid bruit.     Cardiovascular:      Rate and Rhythm: Normal rate and regular rhythm.   Pulmonary:      Effort: Pulmonary effort is normal.      Breath sounds: Normal breath sounds.     Musculoskeletal:      Cervical back: Neck supple.      Right lower leg: No edema.      Left lower leg: No edema.   Feet:      Right foot:      Protective Sensation: 3 sites tested.  3 sites sensed.      Left foot:      Protective Sensation: 3 sites tested.  3 sites sensed.     Neurological:      General: No focal deficit present.      Mental Status: He is alert.     Psychiatric:         Mood and Affect: Mood normal.       Assessment/Plan   Assessment & Plan  Type 2 diabetes mellitus with stage 3b chronic kidney disease, without long-term current use of insulin (Multi)  Chronic. Continue Tradjenta 5mg daily. Low carbohydrate diet and increase in activity. Recheck in 3 months. CKD stable, continue following with nephrology as scheduled.          Primary hypertension  Chronic. Continue Lisinopril 5mg daily. DASH diet and 30 minutes of exercise 5x/week. Check home BP and keep log. Recheck in 3 months, call office sooner for persistent readings >140/90.               "

## 2025-07-28 NOTE — ASSESSMENT & PLAN NOTE
Chronic. Continue Tradjenta 5mg daily. Low carbohydrate diet and increase in activity. Recheck in 3 months. CKD stable, continue following with nephrology as scheduled.

## 2025-07-29 ENCOUNTER — HOSPITAL ENCOUNTER (EMERGENCY)
Facility: HOSPITAL | Age: 75
Discharge: HOME | End: 2025-07-29
Attending: EMERGENCY MEDICINE
Payer: MEDICARE

## 2025-07-29 ENCOUNTER — PHARMACY VISIT (OUTPATIENT)
Dept: PHARMACY | Facility: CLINIC | Age: 75
End: 2025-07-29
Payer: COMMERCIAL

## 2025-07-29 ENCOUNTER — APPOINTMENT (OUTPATIENT)
Dept: RADIOLOGY | Facility: HOSPITAL | Age: 75
End: 2025-07-29
Payer: MEDICARE

## 2025-07-29 ENCOUNTER — APPOINTMENT (OUTPATIENT)
Dept: CARDIOLOGY | Facility: HOSPITAL | Age: 75
End: 2025-07-29
Payer: MEDICARE

## 2025-07-29 VITALS
OXYGEN SATURATION: 100 % | HEIGHT: 70 IN | DIASTOLIC BLOOD PRESSURE: 74 MMHG | HEART RATE: 64 BPM | WEIGHT: 158 LBS | TEMPERATURE: 98.1 F | RESPIRATION RATE: 15 BRPM | BODY MASS INDEX: 22.62 KG/M2 | SYSTOLIC BLOOD PRESSURE: 144 MMHG

## 2025-07-29 DIAGNOSIS — R55 SYNCOPE AND COLLAPSE: Primary | ICD-10-CM

## 2025-07-29 DIAGNOSIS — S80.211A ABRASION OF RIGHT KNEE, INITIAL ENCOUNTER: ICD-10-CM

## 2025-07-29 DIAGNOSIS — S16.1XXA STRAIN OF NECK MUSCLE, INITIAL ENCOUNTER: ICD-10-CM

## 2025-07-29 LAB
ALBUMIN SERPL BCP-MCNC: 3.3 G/DL (ref 3.4–5)
ALP SERPL-CCNC: 81 U/L (ref 33–136)
ALT SERPL W P-5'-P-CCNC: 9 U/L (ref 10–52)
ANION GAP SERPL CALCULATED.3IONS-SCNC: 13 MMOL/L (ref 10–20)
AST SERPL W P-5'-P-CCNC: 12 U/L (ref 9–39)
BASOPHILS # BLD AUTO: 0.03 X10*3/UL (ref 0–0.1)
BASOPHILS NFR BLD AUTO: 0.4 %
BILIRUB SERPL-MCNC: 0.5 MG/DL (ref 0–1.2)
BUN SERPL-MCNC: 29 MG/DL (ref 6–23)
CALCIUM SERPL-MCNC: 8.3 MG/DL (ref 8.6–10.3)
CHLORIDE SERPL-SCNC: 109 MMOL/L (ref 98–107)
CO2 SERPL-SCNC: 20 MMOL/L (ref 21–32)
CREAT SERPL-MCNC: 2 MG/DL (ref 0.5–1.3)
EGFRCR SERPLBLD CKD-EPI 2021: 34 ML/MIN/1.73M*2
EOSINOPHIL # BLD AUTO: 0.02 X10*3/UL (ref 0–0.4)
EOSINOPHIL NFR BLD AUTO: 0.2 %
ERYTHROCYTE [DISTWIDTH] IN BLOOD BY AUTOMATED COUNT: 13.3 % (ref 11.5–14.5)
GLUCOSE SERPL-MCNC: 203 MG/DL (ref 74–99)
HCT VFR BLD AUTO: 29.9 % (ref 41–52)
HGB BLD-MCNC: 9.6 G/DL (ref 13.5–17.5)
IMM GRANULOCYTES # BLD AUTO: 0.06 X10*3/UL (ref 0–0.5)
IMM GRANULOCYTES NFR BLD AUTO: 0.7 % (ref 0–0.9)
LYMPHOCYTES # BLD AUTO: 0.84 X10*3/UL (ref 0.8–3)
LYMPHOCYTES NFR BLD AUTO: 9.9 %
MAGNESIUM SERPL-MCNC: 1.83 MG/DL (ref 1.6–2.4)
MCH RBC QN AUTO: 31.6 PG (ref 26–34)
MCHC RBC AUTO-ENTMCNC: 32.1 G/DL (ref 32–36)
MCV RBC AUTO: 98 FL (ref 80–100)
MONOCYTES # BLD AUTO: 0.41 X10*3/UL (ref 0.05–0.8)
MONOCYTES NFR BLD AUTO: 4.9 %
NEUTROPHILS # BLD AUTO: 7.09 X10*3/UL (ref 1.6–5.5)
NEUTROPHILS NFR BLD AUTO: 83.9 %
NRBC BLD-RTO: 0 /100 WBCS (ref 0–0)
PLATELET # BLD AUTO: 237 X10*3/UL (ref 150–450)
POTASSIUM SERPL-SCNC: 4.5 MMOL/L (ref 3.5–5.3)
PROT SERPL-MCNC: 5.6 G/DL (ref 6.4–8.2)
RBC # BLD AUTO: 3.04 X10*6/UL (ref 4.5–5.9)
SODIUM SERPL-SCNC: 137 MMOL/L (ref 136–145)
WBC # BLD AUTO: 8.5 X10*3/UL (ref 4.4–11.3)

## 2025-07-29 PROCEDURE — 80053 COMPREHEN METABOLIC PANEL: CPT | Performed by: EMERGENCY MEDICINE

## 2025-07-29 PROCEDURE — 70450 CT HEAD/BRAIN W/O DYE: CPT

## 2025-07-29 PROCEDURE — 2500000004 HC RX 250 GENERAL PHARMACY W/ HCPCS (ALT 636 FOR OP/ED): Performed by: EMERGENCY MEDICINE

## 2025-07-29 PROCEDURE — 96360 HYDRATION IV INFUSION INIT: CPT

## 2025-07-29 PROCEDURE — 70450 CT HEAD/BRAIN W/O DYE: CPT | Performed by: RADIOLOGY

## 2025-07-29 PROCEDURE — 36415 COLL VENOUS BLD VENIPUNCTURE: CPT | Performed by: EMERGENCY MEDICINE

## 2025-07-29 PROCEDURE — 85025 COMPLETE CBC W/AUTO DIFF WBC: CPT | Performed by: EMERGENCY MEDICINE

## 2025-07-29 PROCEDURE — 72125 CT NECK SPINE W/O DYE: CPT | Performed by: RADIOLOGY

## 2025-07-29 PROCEDURE — 83735 ASSAY OF MAGNESIUM: CPT | Performed by: EMERGENCY MEDICINE

## 2025-07-29 PROCEDURE — 93005 ELECTROCARDIOGRAM TRACING: CPT

## 2025-07-29 PROCEDURE — 71046 X-RAY EXAM CHEST 2 VIEWS: CPT | Performed by: RADIOLOGY

## 2025-07-29 PROCEDURE — 96361 HYDRATE IV INFUSION ADD-ON: CPT

## 2025-07-29 PROCEDURE — 99285 EMERGENCY DEPT VISIT HI MDM: CPT | Mod: 25 | Performed by: EMERGENCY MEDICINE

## 2025-07-29 PROCEDURE — 72125 CT NECK SPINE W/O DYE: CPT

## 2025-07-29 PROCEDURE — 71046 X-RAY EXAM CHEST 2 VIEWS: CPT

## 2025-07-29 RX ADMIN — SODIUM CHLORIDE 500 ML: 9 INJECTION, SOLUTION INTRAVENOUS at 10:29

## 2025-07-29 ASSESSMENT — LIFESTYLE VARIABLES
TOTAL SCORE: 0
EVER FELT BAD OR GUILTY ABOUT YOUR DRINKING: NO
HAVE PEOPLE ANNOYED YOU BY CRITICIZING YOUR DRINKING: NO
EVER HAD A DRINK FIRST THING IN THE MORNING TO STEADY YOUR NERVES TO GET RID OF A HANGOVER: NO
HAVE YOU EVER FELT YOU SHOULD CUT DOWN ON YOUR DRINKING: NO

## 2025-07-29 ASSESSMENT — PAIN SCALES - GENERAL
PAINLEVEL_OUTOF10: 0 - NO PAIN
PAINLEVEL_OUTOF10: 5 - MODERATE PAIN

## 2025-07-29 ASSESSMENT — PAIN DESCRIPTION - LOCATION: LOCATION: NECK

## 2025-07-29 ASSESSMENT — PAIN - FUNCTIONAL ASSESSMENT: PAIN_FUNCTIONAL_ASSESSMENT: 0-10

## 2025-07-29 NOTE — DISCHARGE INSTRUCTIONS
The cause of your episode of passing out is unclear.  This at times can be related to  nerve what is called a vasovagal episode where your heart rate and or blood pressure slow down causing you to lose consciousness briefly.  These episodes are often benign, however may require follow-up with your primary care physician particularly if you have recurrent episodes for further evaluation.  There was no evidence of abnormal heart rhythm today, however you can have abnormal heart rhythms that come and go and may need further evaluation.  Drink plenty of fluids and change positions slowly.  Follow up with your primary care physician for further care.    Over-the-counter medications as needed for pain.  Keep your abrasion clean and dry return if any developing redness

## 2025-07-29 NOTE — ED TRIAGE NOTES
Pt had 3 near syncope episodes at his wifes doc appt this morning. Pt was able to get himself to the ground and states that he has neck pain. Pt was dizzy and states that he did not eat this morning. Gluc was 246 and VS were stable. EMS states that he was bradycardic.

## 2025-07-29 NOTE — ED PROVIDER NOTES
HPI   Chief Complaint   Patient presents with    Near Syncope    Fall       75-year-old male presents for several near syncopal episodes morning.  Patient admits he did not eat this morning and states he has had recurrent similar issues in the past if he stands up too quickly.  He did attempt to get out of the car it was quite hot he felt very hot and faint and nearly passed out but lowered himself to the ground landing on his right knee and hand.  He did not hit his head or lose consciousness although does state he developed some neck pain that he did not have before.  He was not having any associated chest pain, shortness of breath.  Mitts he probably does not drink enough fluids and he is concerned he could be somewhat dehydrated.  No focal weakness or numbness.  No headache.  Denies other complaints and states he is feeling much better now.  EMS reported that his heart rate was bradycardic but it is not on arrival.      History provided by:  Medical records and patient          Patient History   Medical History[1]  Surgical History[2]  Family History[3]  Social History[4]    Physical Exam   ED Triage Vitals [07/29/25 1005]   Temp Heart Rate Respirations BP   -- 67 18 124/66      Pulse Ox Temp src Heart Rate Source Patient Position   96 % -- -- --      BP Location FiO2 (%)     -- --       Physical Exam  Vitals and nursing note reviewed.     General: Vitals reviewed. Awake, alert, well-developed, well-nourished, NAD  HEENT: NC/AT, PERRL, MMM  Neck: Supple, trachea midline, c-collar in place, no midline C-spine or paraspinal tenderness, step-offs, or deformities  Respiratory: No respiratory distress, lungs clear to auscultation bilaterally, no wheezes, rhonchi, or rales  CV: Regular rate and regular rhythm, no murmur/gallop/rubs  Abdomen/GI: Soft, non-tender, non-distended, no rebound, guarding, or rigidity, normal bowel sounds  Extremities: Moving all extremities, no deformities, small abrasion over right knee, no  bony tenderness palpation, range of motion intact, compartments are soft, distal neurovascular intact in ulcerations  Neuro: AAOx3, cranial nerves intact, strength 5/5 x 4 extremities, sensation diffusely intact, no ataxia on extremeties, no truncal ataxia, normal test of skew  Skin: Warm, dry.       ED Course & MDM   ED Course as of 07/29/25 1500   Tue Jul 29, 2025   1007 EKG on my independent interpretation: Normal sinus rhythm 68 bpm, normal axis, normal intervals, nonspecific T wave abnormality without acute change compared to prior EKG 3/1/2006 [ZENON]      ED Course User Index  [ZENON] Dee Manuel MD         Diagnoses as of 07/29/25 1500   Syncope and collapse   Abrasion of right knee, initial encounter   Strain of neck muscle, initial encounter                 No data recorded     [place] Coma Scale Score: 15 (07/29/25 1228 : Mady Lama, RN)                           Medical Decision Making  75-year-old male presents for syncope today.  He was exposed to heat and admits he did not eat or drink much this morning could be component of vasovagal but consider arrhythmia, dehydration, electrolyte imbalance among others.  He is not having chest pain or shortness of breath to suggest ACS.  No neurologic findings to suggest CVA.  No evidence externally of significant injuries, GCS 15 however given his associated neck pain although he did not hit his head per se we will obtain CT brain, cervical spine to rule out traumatic injuries.  EKG nondiagnostic without evidence of WPW or Brugada.  Labs without significant abnormalities similar to chronic baseline renal insufficiency and anemia for which she was recently started on B12 on outpatient basis.  CT brain, cervical spine with no acute traumatic injuries and chest x-ray on my independent interpretation with no acute cardiopulmonary process, postsurgical changes noted.  Patient was treated with IV fluids and observation feeling significant improved.  He was  able to ambulate with a steady gait and is not feeling lightheaded or dizzy at this time do feel appropriate for discharge home with supportive care and close PCP follow-up.  Return precautions discussed.    Amount and/or Complexity of Data Reviewed  Independent Historian: EMS  External Data Reviewed: notes.     Details: 7/28/2025 outpatient primary care note reviewed showing:  Type 2 diabetes mellitus with stage 3b chronic kidney disease, without long-term current use of insulin (Multi)  Chronic. Continue Tradjenta 5mg daily. Low carbohydrate diet and increase in activity. Recheck in 3 months. CKD stable, continue following with nephrology as scheduled.         Primary hypertension  Chronic. Continue Lisinopril 5mg daily. DASH diet and 30 minutes of exercise 5x/week. Check home BP and keep log. Recheck in 3 months, call office sooner for persistent readings >140/90.        Labs: ordered. Decision-making details documented in ED Course.  Radiology: ordered and independent interpretation performed. Decision-making details documented in ED Course.  ECG/medicine tests: ordered and independent interpretation performed. Decision-making details documented in ED Course.        Procedure  Procedures       [1]   Past Medical History:  Diagnosis Date    Arthritis     Cancer (Multi)     Cataract     Diabetes mellitus (Multi)     Disease of thyroid gland     Encounter for screening for malignant neoplasm of prostate     Screening PSA (prostate specific antigen)    Glaucoma     Hyperlipidemia, unspecified 12/08/2013    Hyperlipidemia    Hypertension     Lung cancer (Multi) 2018    Personal history of colonic polyps     History of colonic polyps    Stroke (Multi)    [2]   Past Surgical History:  Procedure Laterality Date    CHOLECYSTECTOMY  02/10/2014    Cholecystectomy    CT GUIDED IMAGING FOR NEEDLE PLACEMENT  3/28/2018    CT GUIDED IMAGING FOR NEEDLE PLACEMENT [facility]    ESOPHAGOGASTRODUODENOSCOPY  02/10/2014     Diagnostic Esophagogastroduodenoscopy    HERNIA REPAIR      JOINT REPLACEMENT      KNEE ARTHROSCOPY W/ DEBRIDEMENT  02/10/2014    Arthroscopy Knee    LUNG SURGERY  2018    MR HEAD ANGIO WO IV CONTRAST  5/8/2018    MR HEAD ANGIO WO IV CONTRAST [facility]    OTHER SURGICAL HISTORY  02/10/2014    Type Of Stress Test    PROSTATE SURGERY      REFRACTIVE SURGERY  02/10/2014    Corneal LASIK    ROTATOR CUFF REPAIR  02/10/2014    Rotator Cuff Repair    TOTAL KNEE ARTHROPLASTY  02/10/2014    Total Knee Arthroplasty   [3] No family history on file.  [4]   Social History  Tobacco Use    Smoking status: Every Day     Current packs/day: 1.00     Average packs/day: 1 pack/day for 60.6 years (60.6 ttl pk-yrs)     Types: Cigarettes     Start date: 1965     Passive exposure: Current    Smokeless tobacco: Never   Vaping Use    Vaping status: Never Used   Substance Use Topics    Alcohol use: Never    Drug use: Never        Dee Manuel MD  07/29/25 1500

## 2025-07-30 ENCOUNTER — TELEPHONE (OUTPATIENT)
Dept: PRIMARY CARE | Facility: CLINIC | Age: 75
End: 2025-07-30
Payer: MEDICARE

## 2025-07-30 NOTE — TELEPHONE ENCOUNTER
Pt called wanted to let [facility] know he went to [facility] 7/29, he collapsed, was dehydrated, did not want to make an appointment

## 2025-08-01 LAB
ATRIAL RATE: 68 BPM
P AXIS: 69 DEGREES
P OFFSET: 187 MS
P ONSET: 126 MS
PR INTERVAL: 190 MS
Q ONSET: 221 MS
QRS COUNT: 11 BEATS
QRS DURATION: 84 MS
QT INTERVAL: 424 MS
QTC CALCULATION(BAZETT): 450 MS
QTC FREDERICIA: 442 MS
R AXIS: 69 DEGREES
T AXIS: 102 DEGREES
T OFFSET: 433 MS
VENTRICULAR RATE: 68 BPM

## 2025-08-06 ENCOUNTER — TELEPHONE (OUTPATIENT)
Dept: PRIMARY CARE | Facility: CLINIC | Age: 75
End: 2025-08-06
Payer: MEDICARE

## 2025-08-19 DIAGNOSIS — I65.29 CAROTID ARTERY CALCIFICATION, UNSPECIFIED LATERALITY: ICD-10-CM

## 2025-08-19 DIAGNOSIS — I73.9 PERIPHERAL VASCULAR DISEASE, UNSPECIFIED: ICD-10-CM

## 2025-08-19 DIAGNOSIS — I73.9 PERIPHERAL VASCULAR DISEASE: ICD-10-CM

## 2025-08-19 RX ORDER — ATORVASTATIN CALCIUM 40 MG/1
40 TABLET, FILM COATED ORAL DAILY
Qty: 90 TABLET | Refills: 0 | Status: SHIPPED | OUTPATIENT
Start: 2025-08-19

## 2025-08-19 RX ORDER — CLOPIDOGREL BISULFATE 75 MG/1
75 TABLET ORAL DAILY
Qty: 90 TABLET | Refills: 0 | Status: SHIPPED | OUTPATIENT
Start: 2025-08-19

## 2025-10-29 ENCOUNTER — APPOINTMENT (OUTPATIENT)
Dept: PRIMARY CARE | Facility: CLINIC | Age: 75
End: 2025-10-29
Payer: MEDICARE

## 2026-05-07 ENCOUNTER — APPOINTMENT (OUTPATIENT)
Dept: HEMATOLOGY/ONCOLOGY | Facility: CLINIC | Age: 76
End: 2026-05-07
Payer: MEDICARE